# Patient Record
Sex: MALE | Race: WHITE | Employment: FULL TIME | URBAN - METROPOLITAN AREA
[De-identification: names, ages, dates, MRNs, and addresses within clinical notes are randomized per-mention and may not be internally consistent; named-entity substitution may affect disease eponyms.]

---

## 2017-03-16 ENCOUNTER — TRANSFERRED RECORDS (OUTPATIENT)
Dept: HEALTH INFORMATION MANAGEMENT | Facility: CLINIC | Age: 47
End: 2017-03-16

## 2017-07-17 ENCOUNTER — OFFICE VISIT (OUTPATIENT)
Dept: FAMILY MEDICINE | Facility: CLINIC | Age: 47
End: 2017-07-17
Payer: COMMERCIAL

## 2017-07-17 VITALS
WEIGHT: 180 LBS | TEMPERATURE: 98 F | DIASTOLIC BLOOD PRESSURE: 78 MMHG | HEIGHT: 69 IN | OXYGEN SATURATION: 98 % | HEART RATE: 61 BPM | BODY MASS INDEX: 26.66 KG/M2 | SYSTOLIC BLOOD PRESSURE: 104 MMHG

## 2017-07-17 DIAGNOSIS — R41.840 CONCENTRATION DEFICIT: ICD-10-CM

## 2017-07-17 DIAGNOSIS — F41.1 ANXIETY STATE: Primary | ICD-10-CM

## 2017-07-17 PROCEDURE — 99214 OFFICE O/P EST MOD 30 MIN: CPT | Performed by: PHYSICIAN ASSISTANT

## 2017-07-17 ASSESSMENT — ANXIETY QUESTIONNAIRES
5. BEING SO RESTLESS THAT IT IS HARD TO SIT STILL: NEARLY EVERY DAY
6. BECOMING EASILY ANNOYED OR IRRITABLE: NOT AT ALL
GAD7 TOTAL SCORE: 5
3. WORRYING TOO MUCH ABOUT DIFFERENT THINGS: NOT AT ALL
1. FEELING NERVOUS, ANXIOUS, OR ON EDGE: NOT AT ALL
2. NOT BEING ABLE TO STOP OR CONTROL WORRYING: NOT AT ALL
7. FEELING AFRAID AS IF SOMETHING AWFUL MIGHT HAPPEN: NOT AT ALL
IF YOU CHECKED OFF ANY PROBLEMS ON THIS QUESTIONNAIRE, HOW DIFFICULT HAVE THESE PROBLEMS MADE IT FOR YOU TO DO YOUR WORK, TAKE CARE OF THINGS AT HOME, OR GET ALONG WITH OTHER PEOPLE: VERY DIFFICULT

## 2017-07-17 ASSESSMENT — PATIENT HEALTH QUESTIONNAIRE - PHQ9: 5. POOR APPETITE OR OVEREATING: MORE THAN HALF THE DAYS

## 2017-07-17 NOTE — PATIENT INSTRUCTIONS
Please start taking the Zoloft 50 mg tabs, one tab daily instead of 1/2 tab daily.      Please make appointment for assessment for the issues with concentration.     Please followup in clinic in the next 3-4 weeks to see how you are doing on the increased Zoloft dose.      Please be seen sooner if needed.     Please seek immediate medical attention if you develop thoughts of wanting to hurt yourself or others.

## 2017-07-17 NOTE — NURSING NOTE
"Chief Complaint   Patient presents with     Recheck Medication       Initial /78 (BP Location: Left arm, Patient Position: Chair, Cuff Size: Adult Large)  Pulse 61  Temp 98  F (36.7  C) (Oral)  Ht 5' 9\" (1.753 m)  Wt 180 lb (81.6 kg)  SpO2 98%  BMI 26.58 kg/m2 Estimated body mass index is 26.58 kg/(m^2) as calculated from the following:    Height as of this encounter: 5' 9\" (1.753 m).    Weight as of this encounter: 180 lb (81.6 kg).  Medication Reconciliation: complete   Csaba Mlnarik CMA    "

## 2017-07-17 NOTE — PROGRESS NOTES
SUBJECTIVE:                                                    Richard Loza is a 47 year old male who presents to clinic today for the following health issues:      Anxiety Follow-Up    Status since last visit: No change - feels well controlled    Other associated symptoms:cannot focus like used to be able to. Work has changed a little, fidgety and mind wondering -- more multi-tasking than prior -- is concerned    Complicating factors:   Significant life event: No   Current substance abuse: None  Depression symptoms: No  JANAK-7 SCORE 3/30/2013 6/4/2014   Total Score 0 2     GAD7            Amount of exercise or physical activity: 3-4 days/week for an average of 60 minutes    Problems taking medications regularly: No    Medication side effects: none    Diet: gluten-free/reduced and Reduced dairy      Patient is here for a followup.  He reports that he is doing well on the Zoloft and thinks that it is treating what it is supposed to be treating.  He does however have concern about being more fidgety and having his mind wonder.    Patient reports that he has a hard time focusing.  He has had a job change and he is having a harder time staying on task.  He is getting things done, but he finds himself wandering or fidgeting.  He says he has been like this all the way through high school, but now that his job duties have increased, he sees it again and is concerned about his ability to do his job.  He says that his mind tends to dwell on the tasks and his job also.      He reports that he was once on a higher Zoloft dose, and is unsure why he was moved to a lower dose.        Problem list and histories reviewed & adjusted, as indicated.  Additional history: as documented      ROS:  Constitutional, HEENT, cardiovascular, pulmonary, GI, , musculoskeletal, neuro, skin, endocrine and psych systems are negative, except as otherwise noted.    OBJECTIVE:                                                    /78 (BP  "Location: Left arm, Patient Position: Chair, Cuff Size: Adult Large)  Pulse 61  Temp 98  F (36.7  C) (Oral)  Ht 5' 9\" (1.753 m)  Wt 180 lb (81.6 kg)  SpO2 98%  BMI 26.58 kg/m2  Body mass index is 26.58 kg/(m^2).  GENERAL: healthy, alert and no distress  EYES: Eyes grossly normal to inspection, PERRL and conjunctivae and sclerae normal  RESP: lungs clear to auscultation - no rales, rhonchi or wheezes  CV: regular rate and rhythm, normal S1 S2, no S3 or S4, no murmur, click or rub, no peripheral edema and peripheral pulses strong  MS: no gross musculoskeletal defects noted, no edema  NEURO: Normal strength and tone, mentation intact and speech normal  PSYCH: mentation appears normal, affect normal/bright    Diagnostic Test Results:  none      ASSESSMENT/PLAN:                                                      Richard was seen today for recheck medication.    Diagnoses and all orders for this visit:    Anxiety state  -     sertraline (ZOLOFT) 50 MG tablet; Take 1 tablet (50 mg) by mouth daily  -     MENTAL HEALTH REFERRAL    Concentration deficit  -     MENTAL HEALTH REFERRAL    - Zoloft increased as patient describes symptoms of continued anxiety.   - Referral for adult ADHD testing done today as well.     - Followup as advised on plan, or sooner if needed.   - Patient denies thoughts of wanting to hurt himself or others, and was instructed to seek immediate medical attention if thoughts occur.     - Patient agreed with and understood the plan today.     See Patient Instructions:  Please start taking the Zoloft 50 mg tabs, one tab daily instead of 1/2 tab daily.      Please make appointment for assessment for the issues with concentration.     Please followup in clinic in the next 3-4 weeks to see how you are doing on the increased Zoloft dose.      Please be seen sooner if needed.     Please seek immediate medical attention if you develop thoughts of wanting to hurt yourself or others.          Muna Mcclain, " JUDIE    [facility]

## 2017-07-18 ASSESSMENT — ANXIETY QUESTIONNAIRES: GAD7 TOTAL SCORE: 5

## 2017-07-18 ASSESSMENT — PATIENT HEALTH QUESTIONNAIRE - PHQ9: SUM OF ALL RESPONSES TO PHQ QUESTIONS 1-9: 6

## 2017-08-24 ENCOUNTER — FCC EXTENDED DOCUMENTATION (OUTPATIENT)
Dept: PSYCHOLOGY | Facility: CLINIC | Age: 47
End: 2017-08-24

## 2017-08-24 ENCOUNTER — OFFICE VISIT (OUTPATIENT)
Dept: PSYCHOLOGY | Facility: CLINIC | Age: 47
End: 2017-08-24
Payer: COMMERCIAL

## 2017-08-24 DIAGNOSIS — F41.9 ANXIETY DISORDER, UNSPECIFIED: Primary | ICD-10-CM

## 2017-08-24 PROCEDURE — 90834 PSYTX W PT 45 MINUTES: CPT | Performed by: PSYCHOLOGIST

## 2017-08-24 ASSESSMENT — ANXIETY QUESTIONNAIRES
IF YOU CHECKED OFF ANY PROBLEMS ON THIS QUESTIONNAIRE, HOW DIFFICULT HAVE THESE PROBLEMS MADE IT FOR YOU TO DO YOUR WORK, TAKE CARE OF THINGS AT HOME, OR GET ALONG WITH OTHER PEOPLE: SOMEWHAT DIFFICULT
GAD7 TOTAL SCORE: 6
7. FEELING AFRAID AS IF SOMETHING AWFUL MIGHT HAPPEN: NOT AT ALL
3. WORRYING TOO MUCH ABOUT DIFFERENT THINGS: MORE THAN HALF THE DAYS
2. NOT BEING ABLE TO STOP OR CONTROL WORRYING: SEVERAL DAYS
5. BEING SO RESTLESS THAT IT IS HARD TO SIT STILL: SEVERAL DAYS
6. BECOMING EASILY ANNOYED OR IRRITABLE: NOT AT ALL
1. FEELING NERVOUS, ANXIOUS, OR ON EDGE: SEVERAL DAYS

## 2017-08-24 ASSESSMENT — PATIENT HEALTH QUESTIONNAIRE - PHQ9
SUM OF ALL RESPONSES TO PHQ QUESTIONS 1-9: 5
5. POOR APPETITE OR OVEREATING: SEVERAL DAYS

## 2017-08-24 NOTE — PROGRESS NOTES
Progress Note - Initial Session    Client Name:  Richard Loza Date: 8/24/2017         Service Type: Individual/ADHD Eval Intake      Session Start Time: 2:10  Session End Time: 2:50       Session Length: 40 minutes      Session #: 1     Attendees: Client attended alone      Diagnostic Assessment in progress.  Unable to complete documentation at the conclusion of the first session due to gathering extensive information regarding symptom presentation, history, and impact on functioning. Client reported significant history of anxiety and inattention. No risk issues reported.      Mental Status Assessment:  Appearance:   Appropriate   Eye Contact:   Good   Psychomotor Behavior: Normal   Attitude:   Cooperative   Orientation:   All  Speech   Rate / Production: Normal    Volume:  Normal   Mood:    Somewhat anxious  Affect:    Appropriate   Thought Content:  Clear   Thought Form:  Coherent  Logical   Insight:    Good       Safety Issues and Plan for Safety and Risk Management:  Client denies current fears or concerns for personal safety.  Client denies current or recent suicidal ideation or behaviors.  Client denies current or recent homicidal ideation or behaviors.  Client denies current or recent self injurious behavior or ideation.  Client denies other safety concerns.  A safety and risk management plan has not been developed at this time, however client was given the after-hours number / 911 should there be a change in any of these risk factors.  Client did not report whether he has access to firearms; will continue assessing at the time of our next visit.      Diagnostic Criteria:  Anxiety disorder is present, but at this time therapist is unable to determine whether it is primary.  Further assessment needed.        DSM5 Diagnoses: (Sustained by DSM5 Criteria Listed Above)  Diagnoses: 300.00 (F41.9) Unspecified Anxiety Disorder  Psychosocial & Contextual Factors: Client reported longstanding  history of anxiety which he believes is being well-managed with medication (Zoloft). He has Chron's Disease which has been exacerbated by anxiety in the past. 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 He is struggling with task completion and focus and this is worrying him. He described struggling with inattention since elementary school.  WHODAS 2.0 (12 item)            This questionnaire asks about difficulties due to health conditions. Health conditions  include  disease or illnesses, other health problems that may be short or long lasting,  injuries, mental health or emotional problems, and problems with alcohol or drugs.                     Think back over the past 30 days and answer these questions, thinking about how much  difficulty you had doing the following activities. For each question, please [place] only  one response.    S1 Standing for long periods such as 30 minutes? Severe =       4   S2 Taking care of household responsibilities? Moderate =   3   S3 Learning a new task, for example, learning how to get to a new place? Moderate =   3   S4 How much of a problem do you have joining community activities (for example, festivals, [employer] or other activities) in the same way as anyone else can? Moderate =   3   S5 How much have you been emotionally affected by your health problems? Moderate =   3     In the past 30 days, how much difficulty did you have in:   S6 Concentrating on doing something for ten minutes? Severe =       4   S7 Walking a long distance such as a kilometer (or equivalent)? Moderate =   3   S8 Washing your whole body? None =         1   S9 Getting dressed? None =         1   S10 Dealing with people you do not know? None =         1   S11 Maintaining a friendship? None =         1   S12 Your day to day work? Moderate =   3     H1 Overall, in the past 30 days, how many days were  these difficulties present? Record number of days 30   H2 In the past 30 days, for how many days were you totally unable to carry out your usual activities or work because of any health condition? Record number of days  0   H3 In the past 30 days, not counting the days that you were totally unable, for how many days did you cut back or reduce your usual activities or work because of any health condition? Record number of days 0       Collateral Reports Completed:  Routed note to PCP      PLAN: (Homework, other):  Client RTC next week to complete ADHD DA. He was given self-report and collateral-report measures to complete for our next session.      Vee Luque, PhD, LP

## 2017-08-24 NOTE — Clinical Note
Marcial Toro,  I met with Richard today to begin the ADHD evaluation. We will continue with the assessment and I will be sure to route the DA and final report to you. Please let me know if you have any questions or concerns. Thank you for the referral!  Vee Luque

## 2017-08-24 NOTE — MR AVS SNAPSHOT
MRN:[id number]                      After Visit Summary   8/24/2017    Richard Loza    MRN: [id number]           Visit Information        Provider Department      8/24/2017 2:00 PM Vee Luque, PhD [facility] ADHD      Your next 10 appointments already scheduled     Aug 31, 2017 10:00 AM CDT   Return Visit with Vee Luque, PhD   [facility] ([facility])    [address] [place] [id number] [phone]              MyChart Information     [facility] gives you secure access to your electronic health record. If you see a primary care provider, you can also send messages to your care team and make appointments. If you have questions, please call your primary care clinic.  If you do not have a primary care provider, please call [phone] and they will assist you.        Care EveryWhere ID     This is your Care EveryWhere ID. This could be used by other organizations to access your [place] medical records  [id number]        Equal Access to Services     AMOL GARZON : Hadii aad ku hadasho [employer], waaxda luqadaha, qaybta kaalmada adearies, viky mehta . So [facility] [phone].    ATENCIÓN: Si habla español, tiene a qureshi disposición servicios gratuitos de asistencia lingüística. Llame al [phone].    We comply with applicable federal civil rights laws and [place] laws. We do not discriminate on the basis of race, color, national origin, age, disability sex, sexual orientation or gender identity.

## 2017-08-25 ASSESSMENT — ANXIETY QUESTIONNAIRES: GAD7 TOTAL SCORE: 6

## 2017-08-31 ENCOUNTER — DOCUMENTATION ONLY (OUTPATIENT)
Dept: PSYCHOLOGY | Facility: CLINIC | Age: 47
End: 2017-08-31
Payer: COMMERCIAL

## 2017-08-31 ENCOUNTER — OFFICE VISIT (OUTPATIENT)
Dept: PSYCHOLOGY | Facility: CLINIC | Age: 47
End: 2017-08-31
Payer: COMMERCIAL

## 2017-08-31 DIAGNOSIS — F90.2 ATTENTION DEFICIT HYPERACTIVITY DISORDER (ADHD), COMBINED TYPE: ICD-10-CM

## 2017-08-31 DIAGNOSIS — F41.1 GAD (GENERALIZED ANXIETY DISORDER): Primary | ICD-10-CM

## 2017-08-31 DIAGNOSIS — F90.2 ATTENTION DEFICIT HYPERACTIVITY DISORDER (ADHD), COMBINED TYPE: Primary | ICD-10-CM

## 2017-08-31 DIAGNOSIS — F41.9 ANXIETY DISORDER, UNSPECIFIED: ICD-10-CM

## 2017-08-31 PROCEDURE — 96101 HC PSYCHOLOGICAL TEST BY PSYCHOLOGIST/MD, PER HR: CPT | Performed by: PSYCHOLOGIST

## 2017-08-31 PROCEDURE — 90791 PSYCH DIAGNOSTIC EVALUATION: CPT | Performed by: PSYCHOLOGIST

## 2017-08-31 NOTE — PROGRESS NOTES
"Client: Richard Loza  MRN: [id number]  : 1970    [place] Adult ADHD Rating Scale-IV: Self and Other Reports (BAARS-IV)  The BAARS-IV assesses for symptoms of ADHD that are experienced in one's daily life. This assessment measure includes self and collateral rating scales designed to provide information regarding current and childhood symptoms of ADHD including inattention, hyperactivity, and impulsivity. Self-report scores are reported as percentiles. Scores at the 76th-83rd percentile are considered marginal, scores at the 84th-92nd percentile are considered borderline, scores at the 93rd-95th percentile are considered mild, scores at the 96th-98th percentile are considered moderate, and those at the 99th percentile are considered severe. Collateral or \"other\" rating scales are reported as number of symptoms observed in comparison to those reported by the client. Norms and percentile scores are not available for collateral reports.      Current Symptoms Scale--Self Report:   Client completed the self-report inventory of current symptoms. The results indicate that the client's Total ADHD Score was 57 which places him in the 99th percentile for overall ADHD symptoms. In addition, the client endorsed the following occur \"often\" or \"very often\": 9/9 (99th percentile) Inattention symptoms, 4/9 (97th percentile) Hyperactivity/Impulsivity symptoms, and 3/9 (90th percentile) Sluggish Cognitive Tempo symptoms. Client indicated that the reported symptoms have resulted in impaired functioning in school, home, work and social relationships. Overall, the results suggest the client is reporting severe symptoms of inattention and moderate symptoms of hyperactivity/impulsivity at this time.      Current Symptoms Scale--Other Report:  Client's wife completed the collateral report inventory of current symptoms. Based on the collateral contact's observation of symptoms, the client demonstrates the following \"often\" or \"very " "often\": 9/9 Inattention symptoms, 2/5 Hyperactivity symptoms, 1/4 Impulsivity symptoms, and 2/9 Sluggish Cognitive Tempo symptoms. The client's Total ADHD Score was 44. The collateral contact indicated these symptoms causes impairment in school, home, work, and social relationships. The collateral- and self-report scores are similar and suggest the Client is experiencing symptoms of inattention at this time.     Childhood Symptoms Scale--Self-Report:  Client completed the self-report inventory of childhood symptoms. The results indicate that the client's Total ADHD Score was 66 which places him in the 99th percentile for overall ADHD symptoms in childhood. In addition, the client endorsed having experienced the following \"often\" or \"very often\": 9/9 (99th percentile) Inattention symptoms and 8/9 (99th percentile) Hyperactivity-Impulsivity symptoms. Client indicated that the reported symptoms resulted in impaired functioning in school, home, and social relationships. Overall, the results suggest the client reported experiencing severe symptoms of inattention and hyperactivity/impulsivity as a child.     Childhood Symptoms Scale--Other Report:  Client s mother completed the collateral report inventory of childhood symptoms. Based on the collateral contact's recollection of client's childhood symptoms, the client demonstrated the following \"often\" or \"very often\": 6/9 Inattention symptoms and 9/9 Hyperactivity-Impulsivity symptoms. The client's Total ADHD Score was 51. The collateral contact indicated that the client demonstrated impaired functioning in school, home, and social relationships. The collateral- and self-report scores are similar and suggest Client experienced symptoms of inattention and hyperactivity/impulsivity in childhood.    [place] Functional Impairment Scale: Self and Other Reports (BFIS)  The BFIS is used to assess an individuals' psychosocial impairment in major life/daily activities that may be " "due to a mental health disorder. This assessment measure includes self and collateral rating scales. Self-report scores are reported as percentiles. Scores at the 76th-83rd percentile are considered marginal, scores at the 84th-92nd percentile are considered borderline, scores at the 93rd-95th percentile are considered mild, scores at the 96th-98th percentile are considered moderate, and those at the 99th percentile are considered severe. Collateral or \"other\" rating scales are reported as number of symptoms observed in comparison to those reported by the client. Norms and percentile scores are not available for collateral reports.      Results indicate the client identified impairment (scores at or greater than 93rd percentile) in the following areas: home-chores, work, social-strangers, social-friends, education, money management, driving, sexual relations, daily responsibilities and health maintenance. The client's Mean Impairment Score was 6.33 (97th percentile) indicating the client is reporting moderate impairment in functioning across domains. Client's wife completed the collateral rating scale, which indicated somewhat discrepant results (e.g., Mean Impairment Score of 5.26). The collateral contact s scores were generally lower. Client's wife identified impairment in the areas of home-family, home-chores, work, education, money management, driving, and daily responsibilities.      [place] Deficits in Executive Functioning Scale (BDEFS)  The BDEFS is a measure used for evaluating dimensions of adult executive functioning in daily life. This assessment measure includes self and collateral rating scales. Self-report scores are reported as percentiles. Scores at the 76th-83rd percentile are considered marginal, scores at the 84th-92nd percentile are considered borderline, scores at the 93rd-95th percentile are considered mild, scores at the 96th-98th percentile are considered moderate, and those at the 99th " "percentile are considered severe. Collateral or \"other\" rating scales are reported as number of symptoms observed in comparison to those reported by the client. Norms and percentile scores are not available for collateral reports.      Results indicate the client's Total Executive Functioning Score was 253 (98th percentile). The ADHD-Executive Functioning Index score was 33 (99th percentile). These scores suggest the client has borderline to moderate to severe deficits in executive functioning. These deficits may be due to ADHD or another mental health disorder. Results indicate the client identified significant deficits in the following areas: self-management to time (severe), self-organization/problem-solving (moderate), self-restraint (mild), and self-motivation (moderate). Client's wife completed the collateral rating scale, which indicated somewhat discrepant results. The collateral contact's scores were generally lower than the client's report. She noted moderate impairment in self-motivation and mild impairment in self-restraint.    Generalized Anxiety Disorder Questionnaire (JANAK-7)  This questionnaire is designed to screen for anxiety in adults. Based on the client's score of 3, he is reporting low symptoms of anxiety. Client identified the following symptoms of anxiety: feeling on edge/nervous/anxious, trouble relaxing, and restlessness.    Patient Health Questionnaire- 9 (PHQ-9)   This questionnaire is designed to screen for depression in adults. Based on the client's score of 10, he is reporting moderate symptoms of depression. Client endorsed the following occurring  Several Days : feeling down, poor appetite or overeating, feeling bad about self, and feeling fidgety and restless. He endorsed the following symptoms occurring  Nearly Every Day : trouble falling asleep and trouble concentrating.  "

## 2017-08-31 NOTE — MR AVS SNAPSHOT
MRN:[id number]                      After Visit Summary   8/31/2017    Richard Loza    MRN: [id number]           Visit Information        Provider Department      8/31/2017 10:00 AM Vee Luque, PhD [facility] ADHD      MyChart Information     MyChart gives you secure access to your electronic health record. If you see a primary care provider, you can also send messages to your care team and make appointments. If you have questions, please call your primary care clinic.  If you do not have a primary care provider, please call [phone] and they will assist you.        Care EveryWhere ID     This is your Care EveryWhere ID. This could be used by other organizations to access your [place] medical records  [id number]        Equal Access to Services     AMOL GARZON : Rodolfo Moreno, waaxda luqadaha, qaybta kaalmaelliot ricci, viky clifton. So [facility] [phone].    ATENCIÓN: Si habla español, tiene a qureshi disposición servicios gratuitos de asistencia lingüística. Llame al [phone].    We comply with applicable federal civil rights laws and [place] laws. We do not discriminate on the basis of race, color, national origin, age, disability sex, sexual orientation or gender identity.

## 2017-08-31 NOTE — Clinical Note
Marcial Toro,  Here is the DA for Richard's ADHD Evaluation. We will continue with the assessment. I will score and interpret self-report and collateral report measures and Richard will take the MMPI-2. He meets DSM5 criteria for Generalized Anxiety Disorder and has been assigned a provisional diagnosis of ADHD, Combined Presentation. I will be sure to route a copy of the final report to you. Please let me know if you have questions or concerns.  Thank you! Vee Luque
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
## 2017-09-18 ENCOUNTER — OFFICE VISIT (OUTPATIENT)
Dept: PSYCHOLOGY | Facility: CLINIC | Age: 47
End: 2017-09-18
Payer: COMMERCIAL

## 2017-09-18 DIAGNOSIS — F90.2 ATTENTION DEFICIT HYPERACTIVITY DISORDER (ADHD), COMBINED TYPE: Primary | ICD-10-CM

## 2017-09-18 PROCEDURE — 99207 ZZC NO CHARGE LOS: CPT | Performed by: PSYCHOLOGIST

## 2017-09-18 NOTE — MR AVS SNAPSHOT
MRN:[id number]                      After Visit Summary   9/18/2017    Richard Loza    MRN: [id number]           Visit Information        Provider Department      9/18/2017 2:00 PM Vee Luque, PhD [facility] ADHD      MyChart Information     MyChart gives you secure access to your electronic health record. If you see a primary care provider, you can also send messages to your care team and make appointments. If you have questions, please call your primary care clinic.  If you do not have a primary care provider, please call [phone] and they will assist you.        Care EveryWhere ID     This is your Care EveryWhere ID. This could be used by other organizations to access your [facility] medical records  [id number]        Equal Access to Services     AMOL GARZON : Rodolfo Moreno, waaxda luqadaha, qaybta kaalmaelliot ricci, viky clifton. So [facility] [phone].    ATENCIÓN: Si habla español, tiene a qureshi disposición servicios gratuitos de asistencia lingüística. Llame al [phone].    We comply with applicable federal civil rights laws and [place] laws. We do not discriminate on the basis of race, color, national origin, age, disability sex, sexual orientation or gender identity.

## 2017-09-18 NOTE — PROGRESS NOTES
Client arrived to take the MMPI.  There were no reported difficulties with taking the test.  Client will receive results of the testing once the evaluation is completed.

## 2017-09-20 ENCOUNTER — DOCUMENTATION ONLY (OUTPATIENT)
Dept: PSYCHOLOGY | Facility: CLINIC | Age: 47
End: 2017-09-20
Payer: COMMERCIAL

## 2017-09-20 DIAGNOSIS — F90.2 ATTENTION DEFICIT HYPERACTIVITY DISORDER (ADHD), COMBINED TYPE: Primary | ICD-10-CM

## 2017-09-20 PROCEDURE — 96101 HC PSYCHOLOGICAL TEST BY PSYCHOLOGIST/MD, PER HR: CPT | Performed by: PSYCHOLOGIST

## 2017-09-20 NOTE — PROGRESS NOTES
Client: Richard Loza  MRN: [id number]  : 1970    Client completed the [place] Multiphasic Personality Inventory-2 (MMPI-2), a self-report personality inventory, as part of his evaluation. Validity scales indicate that the client responded in a manner with an indiscriminate preference for False-marking or naysaying. Underreporting is possible and suggests that the Client may deny problems or weaknesses. As such, retesting may be warranted. The results should be interpreted with caution and in light of other information. Client s profile reflects low levels of general emotional distress and did not produce significant elevations on any clinical scales or subscales.

## 2017-09-21 ENCOUNTER — DOCUMENTATION ONLY (OUTPATIENT)
Dept: PSYCHOLOGY | Facility: CLINIC | Age: 47
End: 2017-09-21
Payer: COMMERCIAL

## 2017-09-21 ENCOUNTER — OFFICE VISIT (OUTPATIENT)
Dept: PSYCHOLOGY | Facility: CLINIC | Age: 47
End: 2017-09-21
Payer: COMMERCIAL

## 2017-09-21 DIAGNOSIS — F41.1 GAD (GENERALIZED ANXIETY DISORDER): ICD-10-CM

## 2017-09-21 DIAGNOSIS — F90.2 ATTENTION DEFICIT HYPERACTIVITY DISORDER (ADHD), COMBINED TYPE: Primary | ICD-10-CM

## 2017-09-21 PROCEDURE — 90832 PSYTX W PT 30 MINUTES: CPT | Performed by: PSYCHOLOGIST

## 2017-09-21 PROCEDURE — 96101 HC PSYCHOLOGICAL TEST BY PSYCHOLOGIST/MD, PER HR: CPT | Performed by: PSYCHOLOGIST

## 2017-09-21 NOTE — MR AVS SNAPSHOT
MRN:[id number]                      After Visit Summary   9/21/2017    Richard Loza    MRN: [id number]           Visit Information        Provider Department      9/21/2017 3:00 PM Vee Luque, PhD [facility]      Your next 10 appointments already scheduled     Nov 10, 2017  9:00 AM CST   Return Visit with Vee Luuqe, PhD   [facility] ([facility])    [address] [place] [id number] [phone]              MyChart Information     [employer] gives you secure access to your electronic health record. If you see a primary care provider, you can also send messages to your care team and make appointments. If you have questions, please call your primary care clinic.  If you do not have a primary care provider, please call [phone] and they will assist you.        Care EveryWhere ID     This is your Care EveryWhere ID. This could be used by other organizations to access your [facility] medical records  [id number]        Equal Access to Services     AMOL GARZON : Hadii tor cristobalo Soclarice, waaxda luqadaha, qaybta kaalmaelliot ricci, viky clifton. So [facility] [phone].    ATENCIÓN: Si habla español, tiene a qureshi disposición servicios gratuitos de asistencia lingüística. Llame al [phone].    We comply with applicable federal civil rights laws and [place] laws. We do not discriminate on the basis of race, color, national origin, age, disability sex, sexual orientation or gender identity.
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
## 2017-09-21 NOTE — PROGRESS NOTES
Client Name: Richard Loza              Date: 9/21/2017    Service Type: Individual (ADHD Evaluation feedback session)     Session Start Time: 3:00 Session End Time: 3:27     Session Length: 27 minutes      Session #: (feedback)     Attendees: Client attended alone        DATA        Treatment Objective(s) Addressed in This Session:   Provided feedback on ADHD evaluation. Reviewed test results in depth and answered client's questions. Client diagnosed with ADHD, Combined Presentation and Generalized Anxiety Disorder. Reviewed ADHD symptom management handout. This provider also completed full written report of evaluation, including integration of testing data, summary, and recommendations. Please see Documentation Only dated 9/21/17.     Progress on / Status of Treatment Objective(s) / Homework:   Completed      Intervention:  ADHD Evaluation feedback; Reviewed report (can be found in Documentation Only encounter dated 9/21/17); Client was appreciative of the feedback and expressed understanding of the diagnoses. He expressed intent to follow up with his PCP to discuss medication management.        ASSESSMENT: Current Emotional / Mental Status (status of significant symptoms):  Risk status (Self / Other harm or suicidal ideation)  Client denies current fears or concerns for personal safety.  Client denies current or recent suicidal ideation or behaviors.  Client denies current or recent homicidal ideation or behaviors.  Client denies current or recent self-injurious behavior or ideation.  Client denies other safety concerns.  A safety and risk management plan has not been developed at this time, however client was given the after-hours number / 911 should there be a change in any of these risk factors.     Appearance: Appropriate   Eye Contact: Good   Psychomotor Behavior: Normal   Attitude: Cooperative   Orientation: All  Speech  Rate / Production: Normal   Volume: Normal   Mood: Normal  Affect: Appropriate    Thought Content: Clear   Thought Form: Coherent Logical   Insight: Good      Medication Review:  Client is prescribed Zoloft      Medication Compliance:  Yes     Changes in Health Issues:  None reported     Chemical Use Review:  Substance Use: Chemical use reviewed, no active concerns identified      Tobacco Use: No current tobacco use.      Collateral Reports Completed:  Routed note to Care Team Member(s)     PLAN: (Homework, other)     Recommendations:      1. Schedule an appointment with your physician or psychiatrist to discuss a medication evaluation. Medication can be very helpful in treating the symptoms of ADHD. It will be important that each condition is treated, as treatment for one without the other may lead to an increase in symptoms (e.g., treating ADHD, but not anxiety, can lead to increased anxiety).    2. Access resources through websites, books, and articles such as those provided in the Coping with ADHD handout.    3. Consider working with an ADHD  or individual therapist to learn skills to assist with symptom management, as well as ways to improve relationships, etc., that may have been impacted by your symptoms.    4. If anxiety worsens or becomes more difficult to manage, individual therapy is recommended. Therapies focused on identifying and challenging problematic thought and behavior patterns while increasing the use of healthy coping skills has been found to be effective in treating anxiety. It will be important to set goals in this therapy and work actively toward achieving short-term successes that lead to the completion of each goal. Action-oriented therapies, such as CBT and ACT are particularly recommended for the treatment of chronic anxiety.    5. The use of behavioral strategies such as diaphragmatic breathing, guided imagery, and mindfulness is often helpful in the management of anxiety symptoms.    6. Schedule a follow-up appointment with me in about 6 weeks to review  symptoms, treatment involvement, and struggles and/or successes.         Vee Luque, PhD, LP

## 2017-09-25 ENCOUNTER — OFFICE VISIT (OUTPATIENT)
Dept: FAMILY MEDICINE | Facility: CLINIC | Age: 47
End: 2017-09-25
Payer: COMMERCIAL

## 2017-09-25 VITALS
WEIGHT: 184 LBS | TEMPERATURE: 98.4 F | BODY MASS INDEX: 27.25 KG/M2 | SYSTOLIC BLOOD PRESSURE: 106 MMHG | HEART RATE: 65 BPM | OXYGEN SATURATION: 99 % | DIASTOLIC BLOOD PRESSURE: 78 MMHG | HEIGHT: 69 IN

## 2017-09-25 DIAGNOSIS — F41.1 ANXIETY STATE: ICD-10-CM

## 2017-09-25 DIAGNOSIS — F90.2 ATTENTION DEFICIT HYPERACTIVITY DISORDER (ADHD), COMBINED TYPE: Primary | ICD-10-CM

## 2017-09-25 DIAGNOSIS — Z23 NEED FOR PROPHYLACTIC VACCINATION AND INOCULATION AGAINST INFLUENZA: ICD-10-CM

## 2017-09-25 PROCEDURE — 99213 OFFICE O/P EST LOW 20 MIN: CPT | Mod: 25 | Performed by: PHYSICIAN ASSISTANT

## 2017-09-25 PROCEDURE — 90686 IIV4 VACC NO PRSV 0.5 ML IM: CPT | Performed by: PHYSICIAN ASSISTANT

## 2017-09-25 PROCEDURE — 90471 IMMUNIZATION ADMIN: CPT | Performed by: PHYSICIAN ASSISTANT

## 2017-09-25 RX ORDER — DEXTROAMPHETAMINE SACCHARATE, AMPHETAMINE ASPARTATE, DEXTROAMPHETAMINE SULFATE AND AMPHETAMINE SULFATE 1.25; 1.25; 1.25; 1.25 MG/1; MG/1; MG/1; MG/1
5 TABLET ORAL 2 TIMES DAILY
Qty: 60 TABLET | Refills: 0 | Status: SHIPPED | OUTPATIENT
Start: 2017-09-25 | End: 2017-11-08

## 2017-09-25 ASSESSMENT — ANXIETY QUESTIONNAIRES
IF YOU CHECKED OFF ANY PROBLEMS ON THIS QUESTIONNAIRE, HOW DIFFICULT HAVE THESE PROBLEMS MADE IT FOR YOU TO DO YOUR WORK, TAKE CARE OF THINGS AT HOME, OR GET ALONG WITH OTHER PEOPLE: SOMEWHAT DIFFICULT
7. FEELING AFRAID AS IF SOMETHING AWFUL MIGHT HAPPEN: NOT AT ALL
6. BECOMING EASILY ANNOYED OR IRRITABLE: NOT AT ALL
GAD7 TOTAL SCORE: 5
5. BEING SO RESTLESS THAT IT IS HARD TO SIT STILL: SEVERAL DAYS
3. WORRYING TOO MUCH ABOUT DIFFERENT THINGS: SEVERAL DAYS
2. NOT BEING ABLE TO STOP OR CONTROL WORRYING: SEVERAL DAYS
1. FEELING NERVOUS, ANXIOUS, OR ON EDGE: SEVERAL DAYS

## 2017-09-25 ASSESSMENT — PATIENT HEALTH QUESTIONNAIRE - PHQ9
5. POOR APPETITE OR OVEREATING: SEVERAL DAYS
SUM OF ALL RESPONSES TO PHQ QUESTIONS 1-9: 4

## 2017-09-25 NOTE — MR AVS SNAPSHOT
After Visit Summary   9/25/2017    Richard Loza    MRN: [id number]           Patient Information     Date Of Birth          1970        Visit Information        Provider Department      9/25/2017 8:00 AM Muna Mcclain PA-C [facility]        Today's Diagnoses     Need for prophylactic vaccination and inoculation against influenza    -  1    Attention deficit hyperactivity disorder (ADHD), combined type        Anxiety state          Care Instructions    Please start with 5 mg tabs.  Take one tab two times daily.  Can increase dose by 5 mg increments weekly.  Can go to 10 mg in the morning (2 tab) and then one tab (5 mg) in the afternoon on week two.  Than can increase to two tabs (10 mg)  two times daily on week three.      Please followup in about 2-3 weeks to see how you are doing on the dose and with any side effects.  Please followup sooner if needed.           Follow-ups after your visit        Your next 10 appointments already scheduled     Nov 10, 2017  9:00 AM CST   Return Visit with Vee Luque, PhD   [facility]    [address] [place] [id number] [phone]              Who to contact     If you have questions or need follow up information about today's clinic visit or your schedule please contact [facility] directly at [phone].  Normal or non-critical lab and imaging results will be communicated to you by MyChart, letter or phone within 4 business days after the clinic has received the results. If you do not hear from us within 7 days, please contact the clinic through MyChart or phone. If you have a critical or abnormal lab result, we will notify you by phone as soon as possible.  Submit refill requests through [employer] or call your pharmacy and they will forward the refill request to us. Please allow 3 business days for your refill to be completed.  "         Additional Information About Your Visit        MyChart Information     [employer] gives you secure access to your electronic health record. If you see a primary care provider, you can also send messages to your care team and make appointments. If you have questions, please call your primary care clinic.  If you do not have a primary care provider, please call [phone] and they will assist you.        Care EveryWhere ID     This is your Care EveryWhere ID. This could be used by other organizations to access your [facility] medical records  [id number]        Your Vitals Were     Pulse Temperature Height Pulse Oximetry BMI (Body Mass Index)       65 98.4  F (36.9  C) (Oral) 5' 9\" (1.753 m) 99% 27.17 kg/m2        Blood Pressure from Last 3 Encounters:   09/25/17 106/78   07/17/17 104/78   10/18/16 118/76    Weight from Last 3 Encounters:   09/25/17 184 lb (83.5 kg)   07/17/17 180 lb (81.6 kg)   10/18/16 186 lb (84.4 kg)              We Performed the Following     FLU VAC, SPLIT VIRUS IM > 3 YO (QUADRIVALENT) [78994]     Vaccine Administration, Initial [93459]          Today's Medication Changes          These changes are accurate as of: 9/25/17  8:42 AM.  If you have any questions, ask your nurse or doctor.               Start taking these medicines.        Dose/Directions    amphetamine-dextroamphetamine 5 MG per tablet   Commonly known as:  ADDERALL   Used for:  Attention deficit hyperactivity disorder (ADHD), combined type   Started by:  Muna Mcclain, PAManaC        Dose:  5 mg   Take 1 tablet (5 mg) by mouth 2 times daily   Quantity:  60 tablet   Refills:  0            Where to get your medicines      These medications were sent to [facility] [id number] [facility] [address] [facility] [address] [address], [facility] [address]    Hours:  24-hours Phone:  [phone]     sertraline 50 MG tablet         Some of these will need a paper prescription and others " can be bought over the counter.  Ask your nurse if you have questions.     Bring a paper prescription for each of these medications     amphetamine-dextroamphetamine 5 MG per tablet                Primary Care Provider Office Phone # Fax #    Etienne Mistry [phone] [address] [place] [address]        Equal Access to Services     AMOL GARZON : Hadii aad ku hadasho Soomaali, waaxda luqadaha, qaybta kaalmada adeegyada, waxay idiin hayaan adeeg kharash la'georgien . So [facility] [phone].    ATENCIÓN: Si habla español, tiene a qureshi disposición servicios gratuitos de asistencia lingüística. Llame al [phone].    We comply with applicable federal civil rights laws and [place] laws. We do not discriminate on the basis of race, color, national origin, age, disability sex, sexual orientation or gender identity.            Thank you!     Thank you for choosing [facility]  for your care. Our goal is always to provide you with excellent care. Hearing back from our patients is one way we can continue to improve our services. Please take a few minutes to complete the written survey that you may receive in the mail after your visit with us. Thank you!             Your Updated Medication List - Protect others around you: Learn how to safely use, store and throw away your medicines at www.disposemymeds.org.          This list is accurate as of: 9/25/17  8:42 AM.  Always use your most recent med list.                   Brand Name Dispense Instructions for use Diagnosis    amphetamine-dextroamphetamine 5 MG per tablet    ADDERALL    60 tablet    Take 1 tablet (5 mg) by mouth 2 times daily    Attention deficit hyperactivity disorder (ADHD), combined type       colestipol 1 G tablet    COLESTID    90 tablet    Take 1 tablet (1 g) by mouth daily    Crohn's disease of both small and large intestine without complication (H)       HUMIRA PEN 40 MG/0.8ML injection   Generic drug:   adalimumab           sertraline 50 MG tablet    ZOLOFT    30 tablet    Take 1 tablet (50 mg) by mouth daily    Anxiety state

## 2017-09-25 NOTE — PROGRESS NOTES
"  SUBJECTIVE:                                                    Richard Loza is a 47 year old male who presents to clinic today for the following health issues:      Anxiety Follow-Up    Status since last visit: Improved slightly    Other associated symptoms:when feels anxious has a tight feeling in thorax    Complicating factors:   Significant life event: No   Current substance abuse: None  Depression symptoms: No  JANAK-7 SCORE 6/4/2014 7/17/2017 8/24/2017   Total Score 2 - -   Total Score - 5 6       GAD7    PHQ-9 SCORE 6/2/2014 7/17/2017 8/24/2017   Total Score 0 - -   Total Score - 6 5         Amount of exercise or physical activity: 7 days/week for an average of 30 minutes    Problems taking medications regularly: No    Medication side effects: none  Diet: regular (no restrictions)    Patient reports that he is doing well on the Zoloft.  He reports that he did get assessed for adult ADHD and was found to have ADHD and generalized anxiety.  He is interested in starting medication for the ADHD.      Problem list and histories reviewed & adjusted, as indicated.  Additional history: as documented      ROS:  Constitutional, HEENT, cardiovascular, pulmonary, GI, , musculoskeletal, neuro, skin, endocrine and psych systems are negative, except as otherwise noted.    OBJECTIVE:                                                    /78 (BP Location: Right arm, Patient Position: Chair, Cuff Size: Adult Large)  Pulse 65  Temp 98.4  F (36.9  C) (Oral)  Ht 5' 9\" (1.753 m)  Wt 184 lb (83.5 kg)  SpO2 99%  BMI 27.17 kg/m2  Body mass index is 27.17 kg/(m^2).  GENERAL: healthy, alert and no distress  EYES: Eyes grossly normal to inspection, PERRL and conjunctivae and sclerae normal  RESP: lungs clear to auscultation - no rales, rhonchi or wheezes  CV: regular rate and rhythm, normal S1 S2, no S3 or S4, no murmur, click or rub, no peripheral edema and peripheral pulses strong  MS: no gross musculoskeletal defects " noted, no edema  NEURO: Normal strength and tone, mentation intact and speech normal  PSYCH: mentation appears normal, affect normal/bright    Diagnostic Test Results:  none      ASSESSMENT/PLAN:                                                      Richard was seen today for recheck medication.    Diagnoses and all orders for this visit:    Attention deficit hyperactivity disorder (ADHD), combined type  -     amphetamine-dextroamphetamine (ADDERALL) 5 MG per tablet; Take 1 tablet (5 mg) by mouth 2 times daily    Anxiety state  -     sertraline (ZOLOFT) 50 MG tablet; Take 1 tablet (50 mg) by mouth daily    Need for prophylactic vaccination and inoculation against influenza  -     Vaccine Administration, Initial [88760]  -     FLU VAC, SPLIT VIRUS IM > 3 YO (QUADRIVALENT) [84739]        See Patient Instructions:  Please start with 5 mg tabs.  Take one tab two times daily.  Can increase dose by 5 mg increments weekly.  Can go to 10 mg in the morning (2 tab) and then one tab (5 mg) in the afternoon on week two.  Than can increase to two tabs (10 mg)  two times daily on week three.      Please followup in about 2-3 weeks to see how you are doing on the dose and with any side effects.  Please followup sooner if needed.     -- Patient agrees with and understands the plan today.          Muna Mcclain PA-C    [facility] [place]    Injectable Influenza Immunization Documentation    1.  Is the person to be vaccinated sick today?   No    2. Does the person to be vaccinated have an allergy to a component   of the vaccine?   No    3. Has the person to be vaccinated ever had a serious reaction   to influenza vaccine in the past?   No    4. Has the person to be vaccinated ever had Guillain-Barré syndrome?   No    Form completed by Pt

## 2017-09-25 NOTE — LETTER
[facility] [place]    09/25/17    Patient: Richard Loza  YOB: 1970  Medical Record Number: [id number]                                                                  Controlled Substance Agreement  I understand that my care provider has prescribed controlled substances (narcotics, tranquilizers, and/or stimulants) to help manage my condition(s).  I am taking this medicine to help me function or work.  I know that this is strong medicine.  It could have serious side effects and even cause a dependency on the drug.  If I stop these medicines suddenly, I could have severe withdrawal symptoms.    The risks, benefits, and side effects of these medication(s) were explained to me.  I agree that:  1. I will take part in other treatments as advised by my provider.  This may be psychiatry or counseling, physical therapy, behavioral therapy, group treatment, or a referral to a pain clinic.  I will reduce or stop my medicine when my provider tells me to do so.   2. I will take my medicines as prescribed.  I will not change the dose or schedule unless my provider tells me to.  There will be no refills if I  run out early.   I may be contacted at any time without warning and asked to complete a drug test or pill count.   3. I will keep all my appointments at the clinic.  If I miss appointments or fail to follow instructions, my provider may stop my medicine.  4. I will not ask other providers to prescribe controlled substances. And I will not accept controlled substances from other people. If I need another prescribed controlled substance for a new reason, I will notify my provider within one business day.  5. If I enroll in the [place] Medical Marijuana program, I will tell my provider.  I will also sign an agreement to share my medical records with my provider.  6. I will use one pharmacy to fill all of my controlled substance prescriptions.  If my prescription is mailed to my pharmacy, it may take  5 to 7 days for my medicine to be ready.  7. I understand that my provider, clinic care team, and pharmacy can track controlled substance prescriptions from other providers through a central database (prescription monitoring program).  8. I will bring in my list of medications (or my medicine bottles) each time I come to the clinic.  REV- 04/2016                                                                                                                                            Page 1 of 2      [facility] [place]    09/25/17    Patient: Richard Loza  YOB: 1970  Medical Record Number: [id number]    9. Refills of controlled substances will be made only during office hours.  It is up to me to make sure that I do not run out of my medicines on weekends or holidays.    10. I am responsible for my prescriptions.  If the medicine is lost or stolen, it will not be replaced.   I also agree not to share these medicines with anyone.  11. I agree to not use ANY illegal or recreational drugs.  This includes marijuana, cocaine, bath salts or other drugs.  I agree not to use alcohol unless my provider says I may.  I agree to give urine samples whenever asked.  If I fail to give a urine sample, the provider may stop my medicine.     12. I will tell my nurse or provider right away if I become pregnant or have a new medical problem treated outside of [facility].  13. I understand that this medicine can affect my thinking and judgment.  It may be unsafe for me to drive, use machinery and do dangerous tasks.  I will not do any of these things until I know how the medicine affects me.  If my dose changes, I will wait to see how it affects me.  I will contact my provider if I have concerns about medicine side effects.  I understand that if I do not follow any of the conditions above, my prescriptions or treatment may be stopped.    I agree that my provider, clinic care team, and pharmacy may work with  any city, state or federal law enforcement agency that investigates the misuse, sale, or other diversion of my controlled medicine. I will allow my provider to discuss my care with or share a copy of this agreement with any other treating provider, pharmacy or emergency room where I receive care.  I agree to give up (waive) any right of privacy or confidentiality with respect to these authorizations.   I have read this agreement and have asked questions about anything I did not understand.   ___________________________________    ___________________________  Patient Signature                                                           Date and Time  ___________________________________     ____________________________  Witness                                                                            Date and Time  ___________________________________  Muna Mcclain PA-C  REV-  04/2016                                                                                                                                                                 Page 2 of 2

## 2017-09-25 NOTE — PATIENT INSTRUCTIONS
Please start with 5 mg tabs.  Take one tab two times daily.  Can increase dose by 5 mg increments weekly.  Can go to 10 mg in the morning (2 tab) and then one tab (5 mg) in the afternoon on week two.  Than can increase to two tabs (10 mg)  two times daily on week three.      Please followup in about 2-3 weeks to see how you are doing on the dose and with any side effects.  Please followup sooner if needed.

## 2017-09-25 NOTE — NURSING NOTE
"Chief Complaint   Patient presents with     Recheck Medication       Initial /78 (BP Location: Right arm, Patient Position: Chair, Cuff Size: Adult Large)  Pulse 65  Temp 98.4  F (36.9  C) (Oral)  Ht 5' 9\" (1.753 m)  Wt 184 lb (83.5 kg)  SpO2 99%  BMI 27.17 kg/m2 Estimated body mass index is 27.17 kg/(m^2) as calculated from the following:    Height as of this encounter: 5' 9\" (1.753 m).    Weight as of this encounter: 184 lb (83.5 kg).  Medication Reconciliation: complete   Csaba Mlnarik CMA    "

## 2017-09-26 ASSESSMENT — ANXIETY QUESTIONNAIRES: GAD7 TOTAL SCORE: 5

## 2017-10-09 ENCOUNTER — OFFICE VISIT (OUTPATIENT)
Dept: FAMILY MEDICINE | Facility: CLINIC | Age: 47
End: 2017-10-09
Payer: COMMERCIAL

## 2017-10-09 VITALS
TEMPERATURE: 97 F | SYSTOLIC BLOOD PRESSURE: 120 MMHG | RESPIRATION RATE: 12 BRPM | BODY MASS INDEX: 26.66 KG/M2 | HEIGHT: 69 IN | DIASTOLIC BLOOD PRESSURE: 70 MMHG | HEART RATE: 72 BPM | WEIGHT: 180 LBS | OXYGEN SATURATION: 97 %

## 2017-10-09 DIAGNOSIS — F41.1 ANXIETY STATE: ICD-10-CM

## 2017-10-09 DIAGNOSIS — F90.2 ATTENTION DEFICIT HYPERACTIVITY DISORDER (ADHD), COMBINED TYPE: Primary | ICD-10-CM

## 2017-10-09 PROCEDURE — 99000 SPECIMEN HANDLING OFFICE-LAB: CPT | Performed by: PHYSICIAN ASSISTANT

## 2017-10-09 PROCEDURE — 80307 DRUG TEST PRSMV CHEM ANLYZR: CPT | Mod: 90 | Performed by: PHYSICIAN ASSISTANT

## 2017-10-09 PROCEDURE — 99213 OFFICE O/P EST LOW 20 MIN: CPT | Performed by: PHYSICIAN ASSISTANT

## 2017-10-09 RX ORDER — DEXTROAMPHETAMINE SACCHARATE, AMPHETAMINE ASPARTATE, DEXTROAMPHETAMINE SULFATE AND AMPHETAMINE SULFATE 1.25; 1.25; 1.25; 1.25 MG/1; MG/1; MG/1; MG/1
5 TABLET ORAL 3 TIMES DAILY
Qty: 90 TABLET | Refills: 0 | Status: SHIPPED | OUTPATIENT
Start: 2017-10-09 | End: 2017-11-08

## 2017-10-09 ASSESSMENT — ANXIETY QUESTIONNAIRES
3. WORRYING TOO MUCH ABOUT DIFFERENT THINGS: NOT AT ALL
2. NOT BEING ABLE TO STOP OR CONTROL WORRYING: NOT AT ALL
7. FEELING AFRAID AS IF SOMETHING AWFUL MIGHT HAPPEN: NOT AT ALL
5. BEING SO RESTLESS THAT IT IS HARD TO SIT STILL: NOT AT ALL
GAD7 TOTAL SCORE: 0
IF YOU CHECKED OFF ANY PROBLEMS ON THIS QUESTIONNAIRE, HOW DIFFICULT HAVE THESE PROBLEMS MADE IT FOR YOU TO DO YOUR WORK, TAKE CARE OF THINGS AT HOME, OR GET ALONG WITH OTHER PEOPLE: NOT DIFFICULT AT ALL
6. BECOMING EASILY ANNOYED OR IRRITABLE: NOT AT ALL
1. FEELING NERVOUS, ANXIOUS, OR ON EDGE: NOT AT ALL

## 2017-10-09 ASSESSMENT — PATIENT HEALTH QUESTIONNAIRE - PHQ9
SUM OF ALL RESPONSES TO PHQ QUESTIONS 1-9: 0
5. POOR APPETITE OR OVEREATING: NOT AT ALL

## 2017-10-09 NOTE — LETTER
[facility] [place]    10/09/17    Patient: Richard Loza  YOB: 1970  Medical Record Number: [id number]                                                                  Controlled Substance Agreement  I understand that my care provider has prescribed controlled substances (narcotics, tranquilizers, and/or stimulants) to help manage my condition(s).  I am taking this medicine to help me function or work.  I know that this is strong medicine.  It could have serious side effects and even cause a dependency on the drug.  If I stop these medicines suddenly, I could have severe withdrawal symptoms.    The risks, benefits, and side effects of these medication(s) were explained to me.  I agree that:  1. I will take part in other treatments as advised by my provider.  This may be psychiatry or counseling, physical therapy, behavioral therapy, group treatment, or a referral to a pain clinic.  I will reduce or stop my medicine when my provider tells me to do so.   2. I will take my medicines as prescribed.  I will not change the dose or schedule unless my provider tells me to.  There will be no refills if I  run out early.   I may be contacted at any time without warning and asked to complete a drug test or pill count.   3. I will keep all my appointments at the clinic.  If I miss appointments or fail to follow instructions, my provider may stop my medicine.  4. I will not ask other providers to prescribe controlled substances. And I will not accept controlled substances from other people. If I need another prescribed controlled substance for a new reason, I will notify my provider within one business day.  5. If I enroll in the [place] Medical Marijuana program, I will tell my provider.  I will also sign an agreement to share my medical records with my provider.  6. I will use one pharmacy to fill all of my controlled substance prescriptions.  If my prescription is mailed to my pharmacy, it may take  5 to 7 days for my medicine to be ready.  7. I understand that my provider, clinic care team, and pharmacy can track controlled substance prescriptions from other providers through a central database (prescription monitoring program).  8. I will bring in my list of medications (or my medicine bottles) each time I come to the clinic.  REV- 04/2016                                                                                                                                            Page 1 of 2      [facility] [place]    10/09/17    Patient: Richard Loza  YOB: 1970  Medical Record Number: [id number]    9. Refills of controlled substances will be made only during office hours.  It is up to me to make sure that I do not run out of my medicines on weekends or holidays.    10. I am responsible for my prescriptions.  If the medicine is lost or stolen, it will not be replaced.   I also agree not to share these medicines with anyone.  11. I agree to not use ANY illegal or recreational drugs.  This includes marijuana, cocaine, bath salts or other drugs.  I agree not to use alcohol unless my provider says I may.  I agree to give urine samples whenever asked.  If I fail to give a urine sample, the provider may stop my medicine.     12. I will tell my nurse or provider right away if I become pregnant or have a new medical problem treated outside of [facility].  13. I understand that this medicine can affect my thinking and judgment.  It may be unsafe for me to drive, use machinery and do dangerous tasks.  I will not do any of these things until I know how the medicine affects me.  If my dose changes, I will wait to see how it affects me.  I will contact my provider if I have concerns about medicine side effects.  I understand that if I do not follow any of the conditions above, my prescriptions or treatment may be stopped.    I agree that my provider, clinic care team, and pharmacy may work with  any city, state or federal law enforcement agency that investigates the misuse, sale, or other diversion of my controlled medicine. I will allow my provider to discuss my care with or share a copy of this agreement with any other treating provider, pharmacy or emergency room where I receive care.  I agree to give up (waive) any right of privacy or confidentiality with respect to these authorizations.   I have read this agreement and have asked questions about anything I did not understand.   ___________________________________    ___________________________  Patient Signature                                                           Date and Time  ___________________________________     ____________________________  Witness                                                                            Date and Time  ___________________________________  Muna Mcclain PA-C  REV-  04/2016                                                                                                                                                                 Page 2 of 2

## 2017-10-09 NOTE — NURSING NOTE
"Chief Complaint   Patient presents with     Recheck Medication       Initial /70  Pulse 72  Temp 97  F (36.1  C) (Tympanic)  Resp 12  Ht 5' 9\" (1.753 m)  Wt 180 lb (81.6 kg)  SpO2 97%  BMI 26.58 kg/m2 Estimated body mass index is 26.58 kg/(m^2) as calculated from the following:    Height as of this encounter: 5' 9\" (1.753 m).    Weight as of this encounter: 180 lb (81.6 kg).  Medication Reconciliation: complete   Yasmin Bloedow LPN    "

## 2017-10-09 NOTE — PROGRESS NOTES
"  SUBJECTIVE:                                                    Richard Loza is a 47 year old male who presents to clinic today for the following health issues:      Medication Followup of ***    Taking Medication as prescribed: {.:163154::\"yes\"}    Side Effects:  {NONEORCHOOSE:208560::\"None\"}    Medication Helping Symptoms:  {.:727345::\"yes\"}         ***    Problem list and histories reviewed & adjusted, as indicated.  Additional history: as documented      ROS:  Constitutional, HEENT, cardiovascular, pulmonary, GI, , musculoskeletal, neuro, skin, endocrine and psych systems are negative, except as otherwise noted.    OBJECTIVE:                                                    There were no vitals taken for this visit.  There is no height or weight on file to calculate BMI.  {Brief/partially selected :218215::\"GENERAL: healthy, alert and no distress\",\"NECK: no adenopathy, no asymmetry, masses, or scars and thyroid normal to palpation\",\"RESP: lungs clear to auscultation - no rales, rhonchi or wheezes\",\"CV: regular rate and rhythm, normal S1 S2, no S3 or S4, no murmur, click or rub, no peripheral edema and peripheral pulses strong\",\"ABDOMEN: soft, nontender, no hepatosplenomegaly, no masses and bowel sounds normal\",\"MS: no gross musculoskeletal defects noted, no edema\"}    {Diagnostic Test Results:484328::\"Diagnostic Test Results:\",\"none \"}     ASSESSMENT/PLAN:                                                      There are no diagnoses linked to this encounter.    See Patient Instructions        Muna Mcclain PA-C    [facility] [place]    "

## 2017-10-09 NOTE — PROGRESS NOTES
"  SUBJECTIVE:                                                    Richard Loza is a 47 year old male who presents to clinic today for the following health issues:      Anxiety Follow-Up    Status since last visit: Improved     Other associated symptoms:None    Complicating factors:   Significant life event: No   Current substance abuse: None  Depression symptoms: No  JANAK-7 SCORE 8/24/2017 9/25/2017 10/9/2017   Total Score - - -   Total Score 6 5 0       GAD7          Amount of exercise or physical activity: 2-3 days/week for an average of 15-30 minutes    Problems taking medications regularly: No    Medication side effects: none  Diet: regular (no restrictions)      Medication Followup of Adderall    Taking Medication as prescribed: yes    Side Effects:  None    Medication Helping Symptoms:  yes         Patient reports that he is doing well.  He feels very good with the medication that he is taking.  He says that he is \"in awe,\" of how he feels.  He does not have any concerns or side effects to discuss today.  He reports to tolerating the medication well.      He reports that he is taking one 5 mg Adderall 3 times a day.  He reports to taking them around:  7am, 9am, 1-2pm.     PHQ-9:  0   JANAK-7:  0    Problem list and histories reviewed & adjusted, as indicated.  Additional history: as documented      ROS:  Constitutional, HEENT, cardiovascular, pulmonary, GI, , musculoskeletal, neuro, skin, endocrine and psych systems are negative, except as otherwise noted.    OBJECTIVE:                                                    /70  Pulse 72  Temp 97  F (36.1  C) (Tympanic)  Resp 12  Ht 5' 9\" (1.753 m)  Wt 180 lb (81.6 kg)  SpO2 97%  BMI 26.58 kg/m2  Body mass index is 26.58 kg/(m^2).  GENERAL: healthy, alert and no distress  EYES: Eyes grossly normal to inspection, PERRL and conjunctivae and sclerae normal  RESP: lungs clear to auscultation - no rales, rhonchi or wheezes  CV: regular rate and rhythm, " normal S1 S2, no S3 or S4, no murmur, click or rub, no peripheral edema and peripheral pulses strong  MS: no gross musculoskeletal defects noted, no edema  NEURO: Normal strength and tone, mentation intact and speech normal  PSYCH: mentation appears normal, affect normal/bright    Diagnostic Test Results:  none      ASSESSMENT/PLAN:                                                      Richard was seen today for recheck medication.    Diagnoses and all orders for this visit:    Attention deficit hyperactivity disorder (ADHD), combined type  -     Drug  Screen Comprehensive, Urine w/o Reported Meds (Pain Care Package)  -     amphetamine-dextroamphetamine (ADDERALL) 5 MG per tablet; Take 1 tablet (5 mg) by mouth 3 times daily    Anxiety state      - Patient will call for refills.    - Followup in 3 months, or sooner if needed.   - Controlled substance agreement signed today and a urine drug screen left as well.     - Patient agrees with and understands the plan today.     See Patient Instructions:  Please continue the Adderall as prescribed.      Please call about one week ahead every month when you are due for refills.      Please followup in about 3 months, or sooner if needed.          Muna Mcclain PA-C    [facility]

## 2017-10-09 NOTE — PATIENT INSTRUCTIONS
Please continue the Adderall as prescribed.      Please call about one week ahead every month when you are due for refills.      Please followup in about 3 months, or sooner if needed.

## 2017-10-09 NOTE — MR AVS SNAPSHOT
After Visit Summary   10/9/2017    Richard Loza    MRN: [id number]           Patient Information     Date Of Birth          1970        Visit Information        Provider Department      10/9/2017 3:40 PM Muna Mcclain PA-C [facility]        Today's Diagnoses     Attention deficit hyperactivity disorder (ADHD), combined type    -  1    Anxiety state          Care Instructions    Please continue the Adderall as prescribed.      Please call about one week ahead every month when you are due for refills.      Please followup in about 3 months, or sooner if needed.            Follow-ups after your visit        Your next 10 appointments already scheduled     Nov 10, 2017  9:00 AM CST   Return Visit with Vee Luque, PhD   [facility]    [address] [address] [place] [address] [phone]              Who to contact     If you have questions or need follow up information about today's clinic visit or your schedule please contact [facility] directly at [phone].  Normal or non-critical lab and imaging results will be communicated to you by MyChart, letter or phone within 4 business days after the clinic has received the results. If you do not hear from us within 7 days, please contact the clinic through MyChart or phone. If you have a critical or abnormal lab result, we will notify you by phone as soon as possible.  Submit refill requests through [employer] [facility] or call your pharmacy and they will forward the refill request to us. Please allow 3 business days for your refill to be completed.          Additional Information About Your Visit        MyChart Information     [employer] [facility] gives you secure access to your electronic health record. If you see a primary care provider, you can also send messages to your care team and make appointments. If you have questions, please call your primary  "care clinic.  If you do not have a primary care provider, please call [phone] and they will assist you.        Care EveryWhere ID     This is your Care EveryWhere ID. This could be used by other organizations to access your [facility] medical records  [id number]        Your Vitals Were     Pulse Temperature Respirations Height Pulse Oximetry BMI (Body Mass Index)    72 97  F (36.1  C) (Tympanic) 12 5' 9\" (1.753 m) 97% 26.58 kg/m2       Blood Pressure from Last 3 Encounters:   10/09/17 120/70   09/25/17 106/78   07/17/17 104/78    Weight from Last 3 Encounters:   10/09/17 180 lb (81.6 kg)   09/25/17 184 lb (83.5 kg)   07/17/17 180 lb (81.6 kg)              We Performed the Following     Drug  Screen Comprehensive, Urine w/o Reported Meds (Pain Care Package)          Today's Medication Changes          These changes are accurate as of: 10/9/17  4:03 PM.  If you have any questions, ask your nurse or doctor.               These medicines have changed or have updated prescriptions.        Dose/Directions    * amphetamine-dextroamphetamine 5 MG per tablet   Commonly known as:  ADDERALL   This may have changed:  Another medication with the same name was added. Make sure you understand how and when to take each.   Used for:  Attention deficit hyperactivity disorder (ADHD), combined type   Changed by:  Muna Mcclain PA-C        Dose:  5 mg   Take 1 tablet (5 mg) by mouth 2 times daily   Quantity:  60 tablet   Refills:  0       * amphetamine-dextroamphetamine 5 MG per tablet   Commonly known as:  ADDERALL   This may have changed:  You were already taking a medication with the same name, and this prescription was added. Make sure you understand how and when to take each.   Used for:  Attention deficit hyperactivity disorder (ADHD), combined type   Changed by:  Muna Mcclain PA-C        Dose:  5 mg   Take 1 tablet (5 mg) by mouth 3 times daily   Quantity:  90 tablet   Refills:  0       * Notice:  This list has " 2 medication(s) that are the same as other medications prescribed for you. Read the directions carefully, and ask your doctor or other care provider to review them with you.         Where to get your medicines      Some of these will need a paper prescription and others can be bought over the counter.  Ask your nurse if you have questions.     Bring a paper prescription for each of these medications     amphetamine-dextroamphetamine 5 MG per tablet                Primary Care Provider Office Phone # Fax #    Etienne Mistry [phone]       [facility] [address]        Equal Access to Services     [facility]: Hadii aad ku hadasho Soomaali, waaxda luqadaha, qaybta kaalmada ademarianayaelliot, viky mehta . So [facility] [phone].    ATENCIÓN: Si habla español, tiene a qureshi disposición servicios gratuitos de asistencia lingüística. [facility] [phone].    We comply with applicable federal civil rights laws and [place] laws. We do not discriminate on the basis of race, color, national origin, age, disability, sex, sexual orientation, or gender identity.            Thank you!     Thank you for choosing [facility]  for your care. Our goal is always to provide you with excellent care. Hearing back from our patients is one way we can continue to improve our services. Please take a few minutes to complete the written survey that you may receive in the mail after your visit with us. Thank you!             Your Updated Medication List - Protect others around you: Learn how to safely use, store and throw away your medicines at www.disposemymeds.org.          This list is accurate as of: 10/9/17  4:03 PM.  Always use your most recent med list.                   Brand Name Dispense Instructions for use Diagnosis    * amphetamine-dextroamphetamine 5 MG per tablet    ADDERALL    60 tablet    Take 1 tablet (5 mg) by mouth 2 times daily    Attention deficit  hyperactivity disorder (ADHD), combined type       * amphetamine-dextroamphetamine 5 MG per tablet    ADDERALL    90 tablet    Take 1 tablet (5 mg) by mouth 3 times daily    Attention deficit hyperactivity disorder (ADHD), combined type       colestipol 1 G tablet    COLESTID    90 tablet    Take 1 tablet (1 g) by mouth daily    Crohn's disease of both small and large intestine without complication (H)       HUMIRA PEN 40 MG/0.8ML injection   Generic drug:  adalimumab           sertraline 50 MG tablet    ZOLOFT    30 tablet    Take 1 tablet (50 mg) by mouth daily    Anxiety state       * Notice:  This list has 2 medication(s) that are the same as other medications prescribed for you. Read the directions carefully, and ask your doctor or other care provider to review them with you.

## 2017-10-10 ASSESSMENT — ANXIETY QUESTIONNAIRES: GAD7 TOTAL SCORE: 0

## 2017-10-12 ENCOUNTER — TRANSFERRED RECORDS (OUTPATIENT)
Dept: HEALTH INFORMATION MANAGEMENT | Facility: CLINIC | Age: 47
End: 2017-10-12

## 2017-10-16 LAB — COMPREHEN DRUG ANALYSIS UR: NORMAL

## 2017-10-16 NOTE — PROGRESS NOTES
Jacqui Ramos ,    The results from your recent lab work are within normal limits.    - The drug screen resulted as expected.        Thank you for choosing [facility] for your health care needs,      Muna Mcclain PA-C

## 2017-11-08 DIAGNOSIS — F90.2 ATTENTION DEFICIT HYPERACTIVITY DISORDER (ADHD), COMBINED TYPE: ICD-10-CM

## 2017-11-08 NOTE — TELEPHONE ENCOUNTER
amphetamine-dextroamphetamine (ADDERALL) 5 MG per tablet      Last Written Prescription Date:  10/9/2017  Last Fill Quantity: 90 tablet,   # refills: 0  Last office visit:  10/9/2017  Future Office visit:    Next 5 appointments (look out 90 days)     Nov 10, 2017  9:00 AM CST   Return Visit with Vee Luque, PhD   [facility]    [address] [place] [id number] [phone]                   Routing refill request to provider for review/approval because:  Drug not on the [facility], [facility] or  Health refill protocol or controlled substance

## 2017-11-09 RX ORDER — DEXTROAMPHETAMINE SACCHARATE, AMPHETAMINE ASPARTATE, DEXTROAMPHETAMINE SULFATE AND AMPHETAMINE SULFATE 1.25; 1.25; 1.25; 1.25 MG/1; MG/1; MG/1; MG/1
5 TABLET ORAL 3 TIMES DAILY
Qty: 90 TABLET | Refills: 0 | Status: SHIPPED | OUTPATIENT
Start: 2017-11-09 | End: 2017-12-07

## 2017-11-09 NOTE — TELEPHONE ENCOUNTER
CSA not on file     Routing refill request to provider for review/approval because:  Drug not on the [facility] refill protocol     Georgina Lovell RN, BSN  [facility]

## 2017-11-22 DIAGNOSIS — F41.1 ANXIETY STATE: ICD-10-CM

## 2017-11-22 NOTE — TELEPHONE ENCOUNTER
Requested Prescriptions   Pending Prescriptions Disp Refills     sertraline (ZOLOFT) 50 MG tablet [Pharmacy Med Name: SERTRALINE HCL TABS 50MG] 45 tablet 3     Sig: TAKE ONE-HALF (1/2) TABLET DAILY    SSRIs Protocol Passed    11/22/2017  1:25 AM       Passed - Recent or future visit with authorizing provider    Patient had office visit in the last year or has a visit in the next 30 days with authorizing provider.  See chart review.              Passed - Medication is NOT Bupropion    If the medication is Bupropion (Wellbutrin), and the patient is taking for smoking cessation; OK to refill.         Passed - Patient is age 18 or older        LOV: 10/09/2017    PHQ-9 SCORE 8/24/2017 9/25/2017 10/9/2017   Total Score - - -   Total Score 5 4 0     JANAK-7 SCORE 8/24/2017 9/25/2017 10/9/2017   Total Score - - -   Total Score 6 5 0       Refilled per RN Protocol.     Sharon Webster RN  [facility]

## 2017-12-07 DIAGNOSIS — F90.2 ATTENTION DEFICIT HYPERACTIVITY DISORDER (ADHD), COMBINED TYPE: ICD-10-CM

## 2017-12-07 NOTE — TELEPHONE ENCOUNTER
Reason for Call:  Medication or medication refill:    Do you use a [facility]?  Name of the pharmacy and phone number for the current request:  [facility]     Name of the medication requested: amphetamine-dextroamphetamine (ADDERALL) 5 MG per tablet    Can we leave a detailed message on this number? YES    Phone number patient can be reached at: Cell number on file:    Telephone Information:   Mobile [phone]     Best Time: Anytime    Call taken on 12/7/2017 at 2:26 PM by Jocelyn Melton

## 2017-12-07 NOTE — TELEPHONE ENCOUNTER
Medication Detail         Disp Refills Start End LORENZO     amphetamine-dextroamphetamine (ADDERALL) 5 MG per tablet 90 tablet 0 11/9/2017  No     Sig: Take 1 tablet (5 mg) by mouth 3 times daily     Problem List Complete:  No     PROVIDER TO CONSIDER COMPLETION OF PROBLEM LIST AND OVERVIEW/CONTROLLED SUBSTANCE AGREEMENT    Last Office Visit with [facility] primary care provider: 10/09/2017    Future Office visit:     Controlled substance agreement on file: No.     Processing:  Patient will  in clinic    Routing refill request to provider for review/approval because:  Drug not on the [facility] refill protocol       Sharon Webster RN  [facility]

## 2017-12-08 RX ORDER — DEXTROAMPHETAMINE SACCHARATE, AMPHETAMINE ASPARTATE, DEXTROAMPHETAMINE SULFATE AND AMPHETAMINE SULFATE 1.25; 1.25; 1.25; 1.25 MG/1; MG/1; MG/1; MG/1
5 TABLET ORAL 3 TIMES DAILY
Qty: 90 TABLET | Refills: 0 | Status: SHIPPED | OUTPATIENT
Start: 2017-12-08 | End: 2018-01-02

## 2018-01-02 DIAGNOSIS — F90.2 ATTENTION DEFICIT HYPERACTIVITY DISORDER (ADHD), COMBINED TYPE: ICD-10-CM

## 2018-01-04 NOTE — TELEPHONE ENCOUNTER
amphetamine-dextroamphetamine (ADDERALL) 5 MG per tablet 90 tablet 0 12/8/2017  No   Sig: Take 1 tablet (5 mg) by mouth 3 times daily   Class: Local Print       Last Office Visit: 10/9/2017  Future Office visit:         CSA - not on file     Routing refill request to provider for review/approval because:  Drug not on the [facility], [facility] or [facility] refill protocol or controlled substance    Georgina Lovell RN, BSN  [facility]

## 2018-01-05 RX ORDER — DEXTROAMPHETAMINE SACCHARATE, AMPHETAMINE ASPARTATE, DEXTROAMPHETAMINE SULFATE AND AMPHETAMINE SULFATE 1.25; 1.25; 1.25; 1.25 MG/1; MG/1; MG/1; MG/1
5 TABLET ORAL 3 TIMES DAILY
Qty: 90 TABLET | Refills: 0 | Status: SHIPPED | OUTPATIENT
Start: 2018-01-05 | End: 2018-01-12

## 2018-01-05 NOTE — TELEPHONE ENCOUNTER
Attempt #1  Called [phone] - Left a non-detailed message to call back.    If patient calls back, please schedule a fasting physical and close encounter    Sharon Webster RN  [facility]

## 2018-01-05 NOTE — TELEPHONE ENCOUNTER
Refill done for this month, please notify patient that he is due for a followup in clinic this month for further refills.      Rx placed in Rae's bin.

## 2018-01-08 NOTE — TELEPHONE ENCOUNTER
Attempt #2  Called # below - Left a non-detailed message to call back.     If patient calls back, please schedule a fasting physical and close encounter     Sharon Webster RN  [facility]

## 2018-01-09 NOTE — TELEPHONE ENCOUNTER
Called # below - advised of JUDIE SOMERS message below  Med Check scheduled for 01/12/2018    Sharon Webster RN  [facility]

## 2018-01-12 ENCOUNTER — OFFICE VISIT (OUTPATIENT)
Dept: FAMILY MEDICINE | Facility: CLINIC | Age: 48
End: 2018-01-12
Payer: COMMERCIAL

## 2018-01-12 VITALS
BODY MASS INDEX: 26.66 KG/M2 | DIASTOLIC BLOOD PRESSURE: 80 MMHG | HEART RATE: 83 BPM | OXYGEN SATURATION: 98 % | SYSTOLIC BLOOD PRESSURE: 118 MMHG | HEIGHT: 69 IN | WEIGHT: 180 LBS | TEMPERATURE: 98.7 F

## 2018-01-12 DIAGNOSIS — F90.2 ATTENTION DEFICIT HYPERACTIVITY DISORDER (ADHD), COMBINED TYPE: ICD-10-CM

## 2018-01-12 DIAGNOSIS — F41.1 ANXIETY STATE: ICD-10-CM

## 2018-01-12 PROCEDURE — 99213 OFFICE O/P EST LOW 20 MIN: CPT | Performed by: PHYSICIAN ASSISTANT

## 2018-01-12 RX ORDER — DEXTROAMPHETAMINE SACCHARATE, AMPHETAMINE ASPARTATE, DEXTROAMPHETAMINE SULFATE AND AMPHETAMINE SULFATE 1.25; 1.25; 1.25; 1.25 MG/1; MG/1; MG/1; MG/1
5 TABLET ORAL 3 TIMES DAILY
Qty: 90 TABLET | Refills: 0 | Status: SHIPPED | OUTPATIENT
Start: 2018-03-04 | End: 2018-01-12

## 2018-01-12 RX ORDER — DEXTROAMPHETAMINE SACCHARATE, AMPHETAMINE ASPARTATE, DEXTROAMPHETAMINE SULFATE AND AMPHETAMINE SULFATE 1.25; 1.25; 1.25; 1.25 MG/1; MG/1; MG/1; MG/1
5 TABLET ORAL 3 TIMES DAILY
Qty: 90 TABLET | Refills: 0 | Status: SHIPPED | OUTPATIENT
Start: 2018-02-04 | End: 2018-01-12

## 2018-01-12 RX ORDER — DEXTROAMPHETAMINE SACCHARATE, AMPHETAMINE ASPARTATE, DEXTROAMPHETAMINE SULFATE AND AMPHETAMINE SULFATE 1.25; 1.25; 1.25; 1.25 MG/1; MG/1; MG/1; MG/1
5 TABLET ORAL 3 TIMES DAILY
Qty: 90 TABLET | Refills: 0 | Status: SHIPPED | OUTPATIENT
Start: 2018-04-03 | End: 2018-04-17

## 2018-01-12 ASSESSMENT — ANXIETY QUESTIONNAIRES
3. WORRYING TOO MUCH ABOUT DIFFERENT THINGS: NOT AT ALL
6. BECOMING EASILY ANNOYED OR IRRITABLE: NOT AT ALL
GAD7 TOTAL SCORE: 0
2. NOT BEING ABLE TO STOP OR CONTROL WORRYING: NOT AT ALL
5. BEING SO RESTLESS THAT IT IS HARD TO SIT STILL: NOT AT ALL
1. FEELING NERVOUS, ANXIOUS, OR ON EDGE: NOT AT ALL
7. FEELING AFRAID AS IF SOMETHING AWFUL MIGHT HAPPEN: NOT AT ALL

## 2018-01-12 ASSESSMENT — PATIENT HEALTH QUESTIONNAIRE - PHQ9
5. POOR APPETITE OR OVEREATING: NOT AT ALL
SUM OF ALL RESPONSES TO PHQ QUESTIONS 1-9: 0

## 2018-01-12 NOTE — MR AVS SNAPSHOT
After Visit Summary   1/12/2018    Richard Loza    MRN: [id number]           Patient Information     Date Of Birth          1970        Visit Information        Provider Department      1/12/2018 2:20 PM Muna Mcclain PA-C [facility]        Today's Diagnoses     Anxiety state        Attention deficit hyperactivity disorder (ADHD), combined type          Care Instructions    Please followup in about 6 months, or sooner if needed.     Please call one week before needing the refills of the Adderall.            Follow-ups after your visit        Who to contact     If you have questions or need follow up information about today's clinic visit or your schedule please contact [facility] directly at [phone].  Normal or non-critical lab and imaging results will be communicated to you by MyChart, letter or phone within 4 business days after the clinic has received the results. If you do not hear from us within 7 days, please contact the clinic through Art[employer] or phone. If you have a critical or abnormal lab result, we will notify you by phone as soon as possible.  Submit refill requests through Dr. Tariff or call your pharmacy and they will forward the refill request to us. Please allow 3 business days for your refill to be completed.          Additional Information About Your Visit        MyChart Information     Dr. Tariff gives you secure access to your electronic health record. If you see a primary care provider, you can also send messages to your care team and make appointments. If you have questions, please call your primary care clinic.  If you do not have a primary care provider, please call [phone] and they will assist you.        Care EveryWhere ID     This is your Care EveryWhere ID. This could be used by other organizations to access your [facility] medical records  [id number]        Your Vitals Were     Pulse Temperature Height Pulse Oximetry BMI  "(Body Mass Index)       83 98.7  F (37.1  C) (Oral) 5' 9\" (1.753 m) 98% 26.58 kg/m2        Blood Pressure from Last 3 Encounters:   01/12/18 118/80   10/09/17 120/70   09/25/17 106/78    Weight from Last 3 Encounters:   01/12/18 180 lb (81.6 kg)   10/09/17 180 lb (81.6 kg)   09/25/17 184 lb (83.5 kg)              Today, you had the following     No orders found for display         Today's Medication Changes          These changes are accurate as of: 1/12/18  2:53 PM.  If you have any questions, ask your nurse or doctor.               Start taking these medicines.        Dose/Directions    amphetamine-dextroamphetamine 5 MG per tablet   Commonly known as:  ADDERALL   Used for:  Attention deficit hyperactivity disorder (ADHD), combined type   Started by:  Muna Mcclain PA-C        Dose:  5 mg   Start taking on:  4/3/2018   Take 1 tablet (5 mg) by mouth 3 times daily   Quantity:  90 tablet   Refills:  0       sertraline 50 MG tablet   Commonly known as:  ZOLOFT   Used for:  Anxiety state   Started by:  Muna Mcclain PA-C        Dose:  50 mg   Start taking on:  4/3/2018   Take 1 tablet (50 mg) by mouth daily   Quantity:  90 tablet   Refills:  3            Where to get your medicines      These medications were sent to [facility] [place] [id number] [facility] [address]     Phone:  [phone]     sertraline 50 MG tablet         Some of these will need a paper prescription and others can be bought over the counter.  Ask your nurse if you have questions.     Bring a paper prescription for each of these medications     amphetamine-dextroamphetamine 5 MG per tablet                Primary Care Provider Office Phone # Fax #    Muna Mcclain PA-C [phone]       [facility] [address] [address]        Equal Access to Services     AMOL GARZON AH: Hadii tor royal Soclarice, waaxda luqadaha, qaybta vandana ricci, " viky pollackmariana farias'aan ah. So [facility] [phone].    ATENCIÓN: Si habla radhika, tiene a qureshi disposición servicios gratuitos de asistencia lingüística. Howie mccullough [phone].    We comply with applicable federal civil rights laws and [place] laws. We do not discriminate on the basis of race, color, national origin, age, disability, sex, sexual orientation, or gender identity.            Thank you!     Thank you for choosing [facility]  for your care. Our goal is always to provide you with excellent care. Hearing back from our patients is one way we can continue to improve our services. Please take a few minutes to complete the written survey that you may receive in the mail after your visit with us. Thank you!             Your Updated Medication List - Protect others around you: Learn how to safely use, store and throw away your medicines at www.disposemymeds.org.          This list is accurate as of: 1/12/18  2:53 PM.  Always use your most recent med list.                   Brand Name Dispense Instructions for use Diagnosis    amphetamine-dextroamphetamine 5 MG per tablet   Start taking on:  4/3/2018    ADDERALL    90 tablet    Take 1 tablet (5 mg) by mouth 3 times daily    Attention deficit hyperactivity disorder (ADHD), combined type       colestipol 1 G tablet    COLESTID    90 tablet    Take 1 tablet (1 g) by mouth daily    Crohn's disease of both small and large intestine without complication (H)       HUMIRA PEN 40 MG/0.8ML injection   Generic drug:  adalimumab           sertraline 50 MG tablet   Start taking on:  4/3/2018    ZOLOFT    90 tablet    Take 1 tablet (50 mg) by mouth daily    Anxiety state

## 2018-01-12 NOTE — PATIENT INSTRUCTIONS
Please followup in about 6 months, or sooner if needed.     Please call one week before needing the refills of the Adderall.

## 2018-01-12 NOTE — NURSING NOTE
"Chief Complaint   Patient presents with     Recheck Medication       Initial /80 (BP Location: Left arm, Patient Position: Chair, Cuff Size: Adult Regular)  Pulse 83  Temp 98.7  F (37.1  C) (Oral)  Ht 5' 9\" (1.753 m)  Wt 180 lb (81.6 kg)  SpO2 98%  BMI 26.58 kg/m2 Estimated body mass index is 26.58 kg/(m^2) as calculated from the following:    Height as of this encounter: 5' 9\" (1.753 m).    Weight as of this encounter: 180 lb (81.6 kg).  Medication Reconciliation: complete   Csaba Mlnarik CMA    "

## 2018-01-13 ASSESSMENT — ANXIETY QUESTIONNAIRES: GAD7 TOTAL SCORE: 0

## 2018-02-28 ENCOUNTER — TRANSFERRED RECORDS (OUTPATIENT)
Dept: HEALTH INFORMATION MANAGEMENT | Facility: CLINIC | Age: 48
End: 2018-02-28

## 2018-04-17 DIAGNOSIS — F90.2 ATTENTION DEFICIT HYPERACTIVITY DISORDER (ADHD), COMBINED TYPE: ICD-10-CM

## 2018-04-17 RX ORDER — DEXTROAMPHETAMINE SACCHARATE, AMPHETAMINE ASPARTATE, DEXTROAMPHETAMINE SULFATE AND AMPHETAMINE SULFATE 1.25; 1.25; 1.25; 1.25 MG/1; MG/1; MG/1; MG/1
5 TABLET ORAL 3 TIMES DAILY
Qty: 90 TABLET | Refills: 0 | Status: SHIPPED | OUTPATIENT
Start: 2018-05-03 | End: 2018-06-01

## 2018-04-17 NOTE — TELEPHONE ENCOUNTER
Controlled Substance Refill Request for      Disp Refills Start End LORENZO   amphetamine-dextroamphetamine (ADDERALL) 5 MG per tablet 90 tablet 0 4/3/2018  No   Sig: Take 1 tablet (5 mg) by mouth 3 times daily       Problem List Complete:  No     PROVIDER TO CONSIDER COMPLETION OF PROBLEM LIST AND OVERVIEW/CONTROLLED SUBSTANCE AGREEMENT      Last Office Visit with [facility] primary care provider: 1/12/2018    Future Office visit:     Controlled substance agreement on file: No.     Processing:  Patient will  in clinic   checked in past 6 months?  No, route to RN      Georgina Lovell RN, BSN  [facility]

## 2018-04-17 NOTE — TELEPHONE ENCOUNTER
Reason for Call:  Medication or medication refill:    Do you use a [facility]?  Name of the pharmacy and phone number for the current request:      Name of the medication requested: amphetamine-dextroamphetamine (ADDERALL) 5 MG per tablet    Other request: no    Can we leave a detailed message on this number? YES    Phone number patient can be reached at: Cell number on file:    Telephone Information:   Mobile [phone]       Best Time: anytime    Call taken on 4/17/2018 at 2:36 PM by Kellie Salcedo

## 2018-06-01 DIAGNOSIS — F90.2 ATTENTION DEFICIT HYPERACTIVITY DISORDER (ADHD), COMBINED TYPE: ICD-10-CM

## 2018-06-01 RX ORDER — DEXTROAMPHETAMINE SACCHARATE, AMPHETAMINE ASPARTATE, DEXTROAMPHETAMINE SULFATE AND AMPHETAMINE SULFATE 1.25; 1.25; 1.25; 1.25 MG/1; MG/1; MG/1; MG/1
5 TABLET ORAL 3 TIMES DAILY
Qty: 90 TABLET | Refills: 0 | Status: SHIPPED | OUTPATIENT
Start: 2018-06-02 | End: 2018-06-19

## 2018-06-01 NOTE — LETTER
[facility]  [address] [place] [id number] [phone]  June 12, 2018    Richard Loza  [address] [address]    Dear Richard,    I care about your health and have reviewed your health plan. I have reviewed your medical conditions, medication list, and lab results and am making recommendations based on this review, to better manage your health.    You are in particular need of attention regarding:  -medication check    I am recommending that you:  -schedule a FOLLOWUP OFFICE APPOINTMENT with me.       Here is a list of Health Maintenance topics that are due now or due soon:  Health Maintenance Due   Topic Date Due     HIV SCREEN (SYSTEM ASSIGNED)  03/14/1989     Prostate Test (PSA) - yearly  10/18/2017     Wellness Visit with your Primary Provider - yearly  10/18/2017       Please call us at [phone] (or use CITYBIZLIST) to address the above recommendations.     Thank you for trusting [facility] and we appreciate the opportunity to serve you.  We look forward to supporting your healthcare needs in the future.    Healthy Regards,        Muna Mcclain PA-C

## 2018-06-01 NOTE — TELEPHONE ENCOUNTER
Controlled Substance Refill Request for amphetamine-dextroamphetamine (ADDERALL) 5 MG per tablet  Problem List Complete:  Yes    Last Written Prescription Date:  5.3.18  Last Fill Quantity: 90,   # refills: 0    Last Office Visit with [facility] primary care provider: 1.12.18    Clinic visit frequency required:      Future Office visit:     Controlled substance agreement on file: No.     Processing:  Fax Rx to listed pharmacy   checked in past 6 months?  No, route to RN

## 2018-06-01 NOTE — TELEPHONE ENCOUNTER
Rx renewed today.  Placed in Rae's basket.      Please inform patient he is due for followup in July.

## 2018-06-02 NOTE — TELEPHONE ENCOUNTER
Walked Rx to [facility].  Left non-detailed message for patient to call back.  Please schedule follow up when patient calls back.  (see previous notes for details)    Rae Jimenez

## 2018-06-12 NOTE — TELEPHONE ENCOUNTER
Second attempt - Left non-detailed message for patient to call back.  Please schedule follow up when patient calls back.  (see previous notes for details)  Letter sent.  Closing encounter.    Rae Jimenez

## 2018-06-19 ENCOUNTER — OFFICE VISIT (OUTPATIENT)
Dept: FAMILY MEDICINE | Facility: CLINIC | Age: 48
End: 2018-06-19
Payer: COMMERCIAL

## 2018-06-19 VITALS
WEIGHT: 186 LBS | HEART RATE: 77 BPM | SYSTOLIC BLOOD PRESSURE: 110 MMHG | BODY MASS INDEX: 27.55 KG/M2 | DIASTOLIC BLOOD PRESSURE: 76 MMHG | HEIGHT: 69 IN | TEMPERATURE: 97.9 F | OXYGEN SATURATION: 100 %

## 2018-06-19 DIAGNOSIS — F41.1 ANXIETY STATE: ICD-10-CM

## 2018-06-19 DIAGNOSIS — Z79.899 CONTROLLED SUBSTANCE AGREEMENT SIGNED: ICD-10-CM

## 2018-06-19 DIAGNOSIS — F90.2 ATTENTION DEFICIT HYPERACTIVITY DISORDER (ADHD), COMBINED TYPE: Primary | ICD-10-CM

## 2018-06-19 PROCEDURE — 99213 OFFICE O/P EST LOW 20 MIN: CPT | Performed by: PHYSICIAN ASSISTANT

## 2018-06-19 RX ORDER — DEXTROAMPHETAMINE SACCHARATE, AMPHETAMINE ASPARTATE, DEXTROAMPHETAMINE SULFATE AND AMPHETAMINE SULFATE 1.25; 1.25; 1.25; 1.25 MG/1; MG/1; MG/1; MG/1
TABLET ORAL
Qty: 120 TABLET | Refills: 0 | Status: SHIPPED | OUTPATIENT
Start: 2018-07-02 | End: 2018-07-23

## 2018-06-19 RX ORDER — DEXTROAMPHETAMINE SACCHARATE, AMPHETAMINE ASPARTATE, DEXTROAMPHETAMINE SULFATE AND AMPHETAMINE SULFATE 1.25; 1.25; 1.25; 1.25 MG/1; MG/1; MG/1; MG/1
5 TABLET ORAL 3 TIMES DAILY
Qty: 90 TABLET | Refills: 0 | Status: SHIPPED | OUTPATIENT
Start: 2018-07-02 | End: 2018-06-19 | Stop reason: DRUGHIGH

## 2018-06-19 NOTE — MR AVS SNAPSHOT
After Visit Summary   6/19/2018    Richard Loza    MRN: [id number]           Patient Information     Date Of Birth          1970        Visit Information        Provider Department      6/19/2018 8:00 AM Muna Mcclain PA-C [facility]        Today's Diagnoses     Attention deficit hyperactivity disorder (ADHD), combined type    -  1    Controlled substance agreement signed - 10.17.2017        Anxiety state           Follow-ups after your visit        Follow-up notes from your care team     Return in about 4 weeks (around 7/17/2018) for medication re-check, please be seen sooner if needed, Physical Exam.      Your next 10 appointments already scheduled     Jul 16, 2018  8:00 AM CDT   [facility] with Muna Mcclain PA-C   [facility]    [address] [place] [phone]              Who to contact     If you have questions or need follow up information about today's clinic visit or your schedule please contact [facility] directly at [phone].  Normal or non-critical lab and imaging results will be communicated to you by MET Techhart, letter or phone within 4 business days after the clinic has received the results. If you do not hear from us within 7 days, please contact the clinic through [employer] or phone. If you have a critical or abnormal lab result, we will notify you by phone as soon as possible.  Submit refill requests through [employer] or call your pharmacy and they will forward the refill request to us. Please allow 3 business days for your refill to be completed.          Additional Information About Your Visit        MET Techhart Information     [employer] gives you secure access to your electronic health record. If you see a primary care provider, you can also send messages to your care team and make appointments. If you have questions, please call your primary care clinic.   "If you do not have a primary care provider, please call [phone] and they will assist you.        Care EveryWhere ID     This is your Care EveryWhere ID. This could be used by other organizations to access your [facility] medical records  [id number]        Your Vitals Were     Pulse Temperature Height Pulse Oximetry BMI (Body Mass Index)       77 97.9  F (36.6  C) (Oral) 5' 9\" (1.753 m) 100% 27.47 kg/m2        Blood Pressure from Last 3 Encounters:   06/19/18 110/76   01/12/18 118/80   10/09/17 120/70    Weight from Last 3 Encounters:   06/19/18 186 lb (84.4 kg)   01/12/18 180 lb (81.6 kg)   10/09/17 180 lb (81.6 kg)              Today, you had the following     No orders found for display         Today's Medication Changes          These changes are accurate as of 6/19/18 11:35 PM.  If you have any questions, ask your nurse or doctor.               These medicines have changed or have updated prescriptions.        Dose/Directions    amphetamine-dextroamphetamine 5 MG per tablet   Commonly known as:  ADDERALL   This may have changed:    - how much to take  - how to take this  - when to take this  - additional instructions  - These instructions start on 7/2/2018. If you are unsure what to do until then, ask your doctor or other care provider.   Used for:  Attention deficit hyperactivity disorder (ADHD), combined type, Controlled substance agreement signed   Changed by:  Muna Mcclain PA-C        Start taking on:  7/2/2018   Take 2 tablets in the morning, then 1 tab around noon, and 1 tab in the afternoon.   Quantity:  120 tablet   Refills:  0            Where to get your medicines      Some of these will need a paper prescription and others can be bought over the counter.  Ask your nurse if you have questions.     Bring a paper prescription for each of these medications     amphetamine-dextroamphetamine 5 MG per tablet                Primary Care Provider Office Phone # Fax #    Muna Mcclain PA-C " [phone]       [address] [facility]   [address]  [facility] [address]        Equal Access to Services     AMOL GARZON : Hadii tor paz lele Moreno, wagamada luqmatteo, qaybta kaalmada radhames, viky kimbroughbon etienne. So [facility] [phone].    ATENCIÓN: Si habla español, tiene a qureshi disposición servicios gratuitos de asistencia lingüística. Llame al [phone].    We comply with applicable federal civil rights laws and [place] laws. We do not discriminate on the basis of race, color, national origin, age, disability, sex, sexual orientation, or gender identity.            Thank you!     Thank you for choosing [facility]  for your care. Our goal is always to provide you with excellent care. Hearing back from our patients is one way we can continue to improve our services. Please take a few minutes to complete the written survey that you may receive in the mail after your visit with us. Thank you!             Your Updated Medication List - Protect others around you: Learn how to safely use, store and throw away your medicines at www.disposemymeds.org.          This list is accurate as of 6/19/18 11:35 PM.  Always use your most recent med list.                   Brand Name Dispense Instructions for use Diagnosis    amphetamine-dextroamphetamine 5 MG per tablet   Start taking on:  7/2/2018    ADDERALL    120 tablet    Take 2 tablets in the morning, then 1 tab around noon, and 1 tab in the afternoon.    Attention deficit hyperactivity disorder (ADHD), combined type, Controlled substance agreement signed       colestipol 1 g tablet    COLESTID    90 tablet    Take 1 tablet (1 g) by mouth daily    Crohn's disease of both small and large intestine without complication (H)       HUMIRA PEN 40 MG/0.8ML injection   Generic drug:  adalimumab           sertraline 50 MG tablet    ZOLOFT    90 tablet    Take 1 tablet (50 mg) by mouth daily    Anxiety state

## 2018-06-19 NOTE — PROGRESS NOTES
"  SUBJECTIVE:                                                    Richard Loza is a 48 year old male who presents to clinic today for the following health issues:      Medication Followup of Adderall 5mg    Taking Medication as prescribed: yes    Side Effects:  None    Medication Helping Symptoms:  yes    Patient reports that he is doing well on his medication.  He does not have any concerns of side effects or symptoms relating to the medication and he reports to tolerating it well.   He does feel as though he could use a little better support by the end of the day.  He reports that he takes the first pill around 7-8 and then the next one around 10-11 and then the last one by 1-3.  He says if he takes it earlier, then he can feel it wearing off still during his work day.      He is taking his sertraline for the anxiety and feeling stable and well controlled on that.      Problem list and histories reviewed & adjusted, as indicated.  Additional history: as documented      ROS:  Constitutional, HEENT, cardiovascular, pulmonary, GI, , musculoskeletal, neuro, skin, endocrine and psych systems are negative, except as otherwise noted.    OBJECTIVE:                                                    /76 (BP Location: Left arm, Patient Position: Chair, Cuff Size: Adult Large)  Pulse 77  Temp 97.9  F (36.6  C) (Oral)  Ht 5' 9\" (1.753 m)  Wt 186 lb (84.4 kg)  SpO2 100%  BMI 27.47 kg/m2  Body mass index is 27.47 kg/(m^2).  GENERAL: healthy, alert and no distress  EYES: Eyes grossly normal to inspection, PERRL and conjunctivae and sclerae normal  RESP: lungs clear to auscultation - no rales, rhonchi or wheezes  CV: regular rate and rhythm, normal S1 S2, no S3 or S4, no murmur, click or rub, no peripheral edema and peripheral pulses strong  MS: no gross musculoskeletal defects noted, no edema  NEURO: Normal strength and tone, mentation intact and speech normal  PSYCH: mentation appears normal, affect " normal/bright    Diagnostic Test Results:  none      ASSESSMENT/PLAN:                                                      Richard was seen today for recheck medication.    Diagnoses and all orders for this visit:    Attention deficit hyperactivity disorder (ADHD), combined type  -     Discontinue: amphetamine-dextroamphetamine (ADDERALL) 5 MG per tablet; Take 1 tablet (5 mg) by mouth 3 times daily  -     amphetamine-dextroamphetamine (ADDERALL) 5 MG per tablet; Take 2 tablets in the morning, then 1 tab around noon, and 1 tab in the afternoon.    Controlled substance agreement signed - 10.17.2017  -     amphetamine-dextroamphetamine (ADDERALL) 5 MG per tablet; Take 2 tablets in the morning, then 1 tab around noon, and 1 tab in the afternoon.    Anxiety state      - Rx done to increase the morning dose to 10 mg, which should allow the patient to not take his second pill until about noon therefore pushing his afternoon dose later as well to give him better coverage during the pm hours of his work day.   - Patient advised to followup for a dose check within the month for further refills.  He has a physical scheduled in July, and will address the medication change at that time, or sooner if needed.     -- I have discussed the patient's diagnosis, and my plan of treatment with the patient and/or family. Patient is aware to followup if symptoms do not improve.  Patient has been advised to be seen sooner or seek more immediate care if symptoms change or worsen.  Patient agrees with and understands the plan today.     See Patient Instructions        Muna Mcclain PA-C    [facility]

## 2018-07-23 ENCOUNTER — OFFICE VISIT (OUTPATIENT)
Dept: FAMILY MEDICINE | Facility: CLINIC | Age: 48
End: 2018-07-23
Payer: COMMERCIAL

## 2018-07-23 VITALS
TEMPERATURE: 98.4 F | WEIGHT: 186 LBS | OXYGEN SATURATION: 97 % | DIASTOLIC BLOOD PRESSURE: 86 MMHG | BODY MASS INDEX: 27.55 KG/M2 | HEART RATE: 90 BPM | SYSTOLIC BLOOD PRESSURE: 124 MMHG | HEIGHT: 69 IN

## 2018-07-23 DIAGNOSIS — Z00.00 ROUTINE GENERAL MEDICAL EXAMINATION AT A HEALTH CARE FACILITY: Primary | ICD-10-CM

## 2018-07-23 DIAGNOSIS — Z12.5 SCREENING FOR PROSTATE CANCER: ICD-10-CM

## 2018-07-23 DIAGNOSIS — Z83.3 FAMILY HISTORY OF DIABETES MELLITUS: ICD-10-CM

## 2018-07-23 DIAGNOSIS — Z13.0 SCREENING FOR DEFICIENCY ANEMIA: ICD-10-CM

## 2018-07-23 DIAGNOSIS — Z11.4 SCREENING FOR HIV (HUMAN IMMUNODEFICIENCY VIRUS): ICD-10-CM

## 2018-07-23 DIAGNOSIS — F90.2 ATTENTION DEFICIT HYPERACTIVITY DISORDER (ADHD), COMBINED TYPE: ICD-10-CM

## 2018-07-23 DIAGNOSIS — F41.1 ANXIETY STATE: ICD-10-CM

## 2018-07-23 DIAGNOSIS — Z13.6 CARDIOVASCULAR SCREENING; LDL GOAL LESS THAN 160: ICD-10-CM

## 2018-07-23 DIAGNOSIS — Z79.899 CONTROLLED SUBSTANCE AGREEMENT SIGNED: ICD-10-CM

## 2018-07-23 LAB
ALBUMIN SERPL-MCNC: 4 G/DL (ref 3.4–5)
ALP SERPL-CCNC: 115 U/L (ref 40–150)
ALT SERPL W P-5'-P-CCNC: 33 U/L (ref 0–70)
ANION GAP SERPL CALCULATED.3IONS-SCNC: 6 MMOL/L (ref 3–14)
AST SERPL W P-5'-P-CCNC: 23 U/L (ref 0–45)
BASOPHILS # BLD AUTO: 0.1 10E9/L (ref 0–0.2)
BASOPHILS NFR BLD AUTO: 0.9 %
BILIRUB SERPL-MCNC: 0.6 MG/DL (ref 0.2–1.3)
BUN SERPL-MCNC: 18 MG/DL (ref 7–30)
CALCIUM SERPL-MCNC: 8.3 MG/DL (ref 8.5–10.1)
CHLORIDE SERPL-SCNC: 105 MMOL/L (ref 94–109)
CHOLEST SERPL-MCNC: 161 MG/DL
CO2 SERPL-SCNC: 27 MMOL/L (ref 20–32)
CREAT SERPL-MCNC: 1.08 MG/DL (ref 0.66–1.25)
DIFFERENTIAL METHOD BLD: NORMAL
EOSINOPHIL # BLD AUTO: 0.2 10E9/L (ref 0–0.7)
EOSINOPHIL NFR BLD AUTO: 3.4 %
ERYTHROCYTE [DISTWIDTH] IN BLOOD BY AUTOMATED COUNT: 11.7 % (ref 10–15)
GFR SERPL CREATININE-BSD FRML MDRD: 73 ML/MIN/1.7M2
GLUCOSE SERPL-MCNC: 107 MG/DL (ref 70–99)
HCT VFR BLD AUTO: 47.6 % (ref 40–53)
HDLC SERPL-MCNC: 35 MG/DL
HGB BLD-MCNC: 16.5 G/DL (ref 13.3–17.7)
LDLC SERPL CALC-MCNC: ABNORMAL MG/DL
LDLC SERPL DIRECT ASSAY-MCNC: 85 MG/DL
LYMPHOCYTES # BLD AUTO: 2.2 10E9/L (ref 0.8–5.3)
LYMPHOCYTES NFR BLD AUTO: 31.9 %
MCH RBC QN AUTO: 30.8 PG (ref 26.5–33)
MCHC RBC AUTO-ENTMCNC: 34.7 G/DL (ref 31.5–36.5)
MCV RBC AUTO: 89 FL (ref 78–100)
MONOCYTES # BLD AUTO: 0.6 10E9/L (ref 0–1.3)
MONOCYTES NFR BLD AUTO: 8.5 %
NEUTROPHILS # BLD AUTO: 3.8 10E9/L (ref 1.6–8.3)
NEUTROPHILS NFR BLD AUTO: 55.3 %
NONHDLC SERPL-MCNC: 126 MG/DL
PLATELET # BLD AUTO: 236 10E9/L (ref 150–450)
POTASSIUM SERPL-SCNC: 4.3 MMOL/L (ref 3.4–5.3)
PROT SERPL-MCNC: 7.9 G/DL (ref 6.8–8.8)
PSA SERPL-ACNC: 0.54 UG/L (ref 0–4)
RBC # BLD AUTO: 5.35 10E12/L (ref 4.4–5.9)
SODIUM SERPL-SCNC: 138 MMOL/L (ref 133–144)
TRIGL SERPL-MCNC: 602 MG/DL
WBC # BLD AUTO: 6.8 10E9/L (ref 4–11)

## 2018-07-23 PROCEDURE — 83721 ASSAY OF BLOOD LIPOPROTEIN: CPT | Mod: 59 | Performed by: PHYSICIAN ASSISTANT

## 2018-07-23 PROCEDURE — 36415 COLL VENOUS BLD VENIPUNCTURE: CPT | Performed by: PHYSICIAN ASSISTANT

## 2018-07-23 PROCEDURE — 99396 PREV VISIT EST AGE 40-64: CPT | Performed by: PHYSICIAN ASSISTANT

## 2018-07-23 PROCEDURE — G0103 PSA SCREENING: HCPCS | Performed by: PHYSICIAN ASSISTANT

## 2018-07-23 PROCEDURE — 85025 COMPLETE CBC W/AUTO DIFF WBC: CPT | Performed by: PHYSICIAN ASSISTANT

## 2018-07-23 PROCEDURE — 80061 LIPID PANEL: CPT | Performed by: PHYSICIAN ASSISTANT

## 2018-07-23 PROCEDURE — 80053 COMPREHEN METABOLIC PANEL: CPT | Performed by: PHYSICIAN ASSISTANT

## 2018-07-23 RX ORDER — DEXTROAMPHETAMINE SACCHARATE, AMPHETAMINE ASPARTATE, DEXTROAMPHETAMINE SULFATE AND AMPHETAMINE SULFATE 1.25; 1.25; 1.25; 1.25 MG/1; MG/1; MG/1; MG/1
TABLET ORAL
Qty: 120 TABLET | Refills: 0 | Status: SHIPPED | OUTPATIENT
Start: 2018-09-01 | End: 2018-07-23

## 2018-07-23 RX ORDER — DEXTROAMPHETAMINE SACCHARATE, AMPHETAMINE ASPARTATE, DEXTROAMPHETAMINE SULFATE AND AMPHETAMINE SULFATE 1.25; 1.25; 1.25; 1.25 MG/1; MG/1; MG/1; MG/1
TABLET ORAL
Qty: 120 TABLET | Refills: 0 | Status: SHIPPED | OUTPATIENT
Start: 2018-08-01 | End: 2018-07-23

## 2018-07-23 RX ORDER — DEXTROAMPHETAMINE SACCHARATE, AMPHETAMINE ASPARTATE, DEXTROAMPHETAMINE SULFATE AND AMPHETAMINE SULFATE 1.25; 1.25; 1.25; 1.25 MG/1; MG/1; MG/1; MG/1
TABLET ORAL
Qty: 120 TABLET | Refills: 0 | Status: SHIPPED | OUTPATIENT
Start: 2018-10-01 | End: 2018-10-31

## 2018-07-23 ASSESSMENT — ANXIETY QUESTIONNAIRES
2. NOT BEING ABLE TO STOP OR CONTROL WORRYING: NOT AT ALL
6. BECOMING EASILY ANNOYED OR IRRITABLE: NOT AT ALL
7. FEELING AFRAID AS IF SOMETHING AWFUL MIGHT HAPPEN: NOT AT ALL
1. FEELING NERVOUS, ANXIOUS, OR ON EDGE: NOT AT ALL
3. WORRYING TOO MUCH ABOUT DIFFERENT THINGS: NOT AT ALL
5. BEING SO RESTLESS THAT IT IS HARD TO SIT STILL: NOT AT ALL
GAD7 TOTAL SCORE: 0

## 2018-07-23 ASSESSMENT — PATIENT HEALTH QUESTIONNAIRE - PHQ9: 5. POOR APPETITE OR OVEREATING: NOT AT ALL

## 2018-07-23 NOTE — MR AVS SNAPSHOT
After Visit Summary   7/23/2018    Ricahrd Loza    MRN: [id number]           Patient Information     Date Of Birth          1970        Visit Information        Provider Department      7/23/2018 9:20 AM Muna Mcclain PA-C [facility] [place]        Today's Diagnoses     Routine general medical examination at a health care facility    -  1    Screening for prostate cancer        Screening for HIV (human immunodeficiency virus)        Family history of diabetes mellitus        Anxiety state        CARDIOVASCULAR SCREENING; LDL GOAL LESS THAN 160        Attention deficit hyperactivity disorder (ADHD), combined type        Screening for deficiency anemia        Controlled substance agreement signed - 10.17.2017          Care Instructions      Preventive Health Recommendations  Male Ages 40 to 49    Yearly exam:             See your health care provider every year in order to  o   Review health changes.   o   Discuss preventive care.    o   Review your medicines if your doctor has prescribed any.    You should be tested each year for STDs (sexually transmitted diseases) if you re at risk.     Have a cholesterol test every 5 years.     Have a colonoscopy (test for colon cancer) if someone in your family has had colon cancer or polyps before age 50.     After age 45, have a diabetes test (fasting glucose). If you are at risk for diabetes, you should have this test every 3 years.      Talk with your health care provider about whether or not a prostate cancer screening test (PSA) is right for you.    Shots: Get a flu shot each year. Get a tetanus shot every 10 years.     Nutrition:    Eat at least 5 servings of fruits and vegetables daily.     Eat whole-grain bread, whole-wheat pasta and brown rice instead of white grains and rice.     Get adequate Calcium and Vitamin D.     Lifestyle    Exercise for at least 150 minutes a week (30 minutes a day, 5 days a week). This will help you control  "your weight and prevent disease.     Limit alcohol to one drink per day.     No smoking.     Wear sunscreen to prevent skin cancer.     See your dentist every six months for an exam and cleaning.              Follow-ups after your visit        Follow-up notes from your care team     Return in about 6 months (around 1/23/2019) for medication re-check, please be seen sooner if needed.      Who to contact     If you have questions or need follow up information about today's clinic visit or your schedule please contact [facility] directly at [phone].  Normal or non-critical lab and imaging results will be communicated to you by [employer], letter or phone within 4 business days after the clinic has received the results. If you do not hear from us within 7 days, please contact the clinic through [employer] or phone. If you have a critical or abnormal lab result, we will notify you by phone as soon as possible.  Submit refill requests through [employer] or call your pharmacy and they will forward the refill request to us. Please allow 3 business days for your refill to be completed.          Additional Information About Your Visit        [employer] Information     [employer] gives you secure access to your electronic health record. If you see a primary care provider, you can also send messages to your care team and make appointments. If you have questions, please call your primary care clinic.  If you do not have a primary care provider, please call [phone] and they will assist you.        Care EveryWhere ID     This is your Care EveryWhere ID. This could be used by other organizations to access your [facility] medical records  [id number]        Your Vitals Were     Pulse Temperature Height Pulse Oximetry BMI (Body Mass Index)       90 98.4  F (36.9  C) (Oral) 5' 9\" (1.753 m) 97% 27.47 kg/m2        Blood Pressure from Last 3 Encounters:   07/23/18 124/86   06/19/18 110/76   01/12/18 118/80    Weight from Last " 3 Encounters:   07/23/18 186 lb (84.4 kg)   06/19/18 186 lb (84.4 kg)   01/12/18 180 lb (81.6 kg)              We Performed the Following     CBC with platelets and differential     Comprehensive metabolic panel (BMP + Alb, Alk Phos, ALT, AST, Total. Bili, TP)     Lipid panel reflex to direct LDL Fasting     PROSTATE SPEC ANTIGEN SCREEN          Today's Medication Changes          These changes are accurate as of 7/23/18 10:04 AM.  If you have any questions, ask your nurse or doctor.               Start taking these medicines.        Dose/Directions    amphetamine-dextroamphetamine 5 MG per tablet   Commonly known as:  ADDERALL   Used for:  Attention deficit hyperactivity disorder (ADHD), combined type, Controlled substance agreement signed   Started by:  Muna Mcclain PA-C        Start taking on:  10/1/2018   Take 2 tablets in the morning, then 1 tab around noon, and 1 tab in the afternoon.   Quantity:  120 tablet   Refills:  0            Where to get your medicines      Some of these will need a paper prescription and others can be bought over the counter.  Ask your nurse if you have questions.     Bring a paper prescription for each of these medications     amphetamine-dextroamphetamine 5 MG per tablet                Primary Care Provider Office Phone # Fax #    Muna Mcclain PA-C [phone]       [address] [place] [address]        Equal Access to Services     [facility] DURAN AH: Hadii tor paz hadasho Soomaali, waaxda luqadaha, qaybta kaalmada adeegyada, waxay lisa hayshreyas clifton. So [facility] [phone].    ATENCIÓN: Si habla español, tiene a qureshi disposición servicios gratuitos de asistencia lingüística. Llame al [phone].    We comply with applicable federal civil rights laws and [place] laws. We do not discriminate on the basis of race, color, national origin, age, disability, sex, sexual orientation, or gender identity.            Thank you!     Thank  you for choosing [facility]  for your care. Our goal is always to provide you with excellent care. Hearing back from our patients is one way we can continue to improve our services. Please take a few minutes to complete the written survey that you may receive in the mail after your visit with us. Thank you!             Your Updated Medication List - Protect others around you: Learn how to safely use, store and throw away your medicines at www.disposemymeds.org.          This list is accurate as of 7/23/18 10:04 AM.  Always use your most recent med list.                   Brand Name Dispense Instructions for use Diagnosis    amphetamine-dextroamphetamine 5 MG per tablet   Start taking on:  10/1/2018    ADDERALL    120 tablet    Take 2 tablets in the morning, then 1 tab around noon, and 1 tab in the afternoon.    Attention deficit hyperactivity disorder (ADHD), combined type, Controlled substance agreement signed       colestipol 1 g tablet    COLESTID    90 tablet    Take 1 tablet (1 g) by mouth daily    Crohn's disease of both small and large intestine without complication (H)       HUMIRA PEN 40 MG/0.8ML injection   Generic drug:  adalimumab           sertraline 50 MG tablet    ZOLOFT    90 tablet    Take 1 tablet (50 mg) by mouth daily    Anxiety state

## 2018-07-23 NOTE — PROGRESS NOTES
SUBJECTIVE:   CC: Richard Loza is an 48 year old male who presents for preventative health visit.     Healthy Habits:    Do you get at least three servings of calcium containing foods daily (dairy, green leafy vegetables, etc.)? yes    Amount of exercise or daily activities, outside of work: 3-4 day(s) per week    Problems taking medications regularly No    Medication side effects: No    Have you had an eye exam in the past two years? no    Do you see a dentist twice per year? yes    Do you have sleep apnea, excessive snoring or daytime drowsiness?no     Medication Followup of Adderall    Taking Medication as prescribed: yes    Side Effects:  None    Medication Helping Symptoms:  Yes    Last dosage: this morning 8:30am        Depression and Anxiety Follow-Up    Status since last visit: Stable    Other associated symptoms:None    Complicating factors:     Significant life event: No     Current substance abuse: None    PHQ-9 9/25/2017 10/9/2017 1/12/2018   Total Score 4 0 0   Q9: Suicide Ideation Not at all Not at all Not at all     JANAK-7 SCORE 9/25/2017 10/9/2017 1/12/2018   Total Score - - -   Total Score 5 0 0     In the past two weeks have you had thoughts of suicide or self-harm?  No.    Do you have concerns about your personal safety or the safety of others?   No  PHQ-9  English  PHQ-9   Any Language  JANAK-7  Suicide Assessment Five-step Evaluation and Treatment (SAFE-T)    Today's PHQ-2 Score: 0-0  PHQ-2 ( 1999 Pfizer) 10/9/2017 10/18/2016   Q1: Little interest or pleasure in doing things 0 0   Q2: Feeling down, depressed or hopeless 0 0   PHQ-2 Score 0 0       Abuse: Current or Past(Physical, Sexual or Emotional)- No  Do you feel safe in your environment - Yes    Social History   Substance Use Topics     Smoking status: Never Smoker     Smokeless tobacco: Never Used     Alcohol use 0.0 - 0.5 oz/week     0 - 1 Standard drinks or equivalent per week      Comment: 1-2 drinks per month      If you drink  "alcohol do you typically have >3 drinks per day or >7 drinks per week? No                      Last PSA:   PSA   Date Value Ref Range Status   10/18/2016 0.50 0 - 4 ug/L Final     Comment:     Assay Method:  Chemiluminescence using Siemens Vista analyzer       Reviewed orders with patient. Reviewed health maintenance and updated orders accordingly - Yes  Labs reviewed in EPIC    Reviewed and updated as needed this visit by clinical staff  Tobacco  Allergies  Meds  Med Hx  Surg Hx  Fam Hx  Soc Hx        Reviewed and updated as needed this visit by Provider            ROS:  CONSTITUTIONAL: NEGATIVE for fever, chills, change in weight  INTEGUMENTARY/SKIN: NEGATIVE for worrisome rashes, moles or lesions  EYES: NEGATIVE for vision changes or irritation  ENT: NEGATIVE for ear, mouth and throat problems  RESP: NEGATIVE for significant cough or SOB  CV: NEGATIVE for chest pain, palpitations or peripheral edema  GI: NEGATIVE for nausea, abdominal pain, heartburn, or change in bowel habits   male: negative for dysuria, hematuria, decreased urinary stream, erectile dysfunction, urethral discharge  MUSCULOSKELETAL: NEGATIVE for significant arthralgias or myalgia  NEURO: NEGATIVE for weakness, dizziness or paresthesias  PSYCHIATRIC: NEGATIVE for changes in mood or affect    OBJECTIVE:   /86 (BP Location: Right arm, Patient Position: Chair, Cuff Size: Adult Large)  Pulse 90  Temp 98.4  F (36.9  C) (Oral)  Ht 5' 9\" (1.753 m)  Wt 186 lb (84.4 kg)  SpO2 97%  BMI 27.47 kg/m2  EXAM:  GENERAL: healthy, alert and no distress  EYES: Eyes grossly normal to inspection, PERRL and conjunctivae and sclerae normal  HENT: ear canals and TM's normal, nose and mouth without ulcers or lesions  NECK: no adenopathy, no asymmetry, masses, or scars and thyroid normal to palpation  RESP: lungs clear to auscultation - no rales, rhonchi or wheezes  CV: regular rate and rhythm, normal S1 S2, no S3 or S4, no murmur, click or rub, no " "peripheral edema and peripheral pulses strong  ABDOMEN: soft, nontender, no hepatosplenomegaly, no masses and bowel sounds normal  MS: no gross musculoskeletal defects noted, no edema  SKIN: no suspicious lesions or rashes  NEURO: Normal strength and tone, mentation intact and speech normal  PSYCH: mentation appears normal, affect normal/bright    Diagnostic Test Results:  Labs - pending  ASSESSMENT/PLAN:   1. Routine general medical examination at a health care facility      2. Anxiety state    - Patient does not need refills on the Zoloft.  This Rx will last until January when he is due for his 6 month medication followup.      3. Attention deficit hyperactivity disorder (ADHD), combined type    - amphetamine-dextroamphetamine (ADDERALL) 5 MG per tablet; Take 2 tablets in the morning, then 1 tab around noon, and 1 tab in the afternoon.  Dispense: 120 tablet; Refill: 0    4. Controlled substance agreement signed - 10.17.2017    - amphetamine-dextroamphetamine (ADDERALL) 5 MG per tablet; Take 2 tablets in the morning, then 1 tab around noon, and 1 tab in the afternoon.  Dispense: 120 tablet; Refill: 0    5. CARDIOVASCULAR SCREENING; LDL GOAL LESS THAN 160    - Lipid panel reflex to direct LDL Fasting    6. Family history of diabetes mellitus    - Comprehensive metabolic panel (BMP + Alb, Alk Phos, ALT, AST, Total. Bili, TP)    7. Screening for prostate cancer    - PROSTATE SPEC ANTIGEN SCREEN    8. Screening for HIV (human immunodeficiency virus)    - Patient declined HIV testing today.      9. Screening for deficiency anemia    - CBC with platelets and differential    COUNSELING:  Reviewed preventive health counseling, as reflected in patient instructions    BP Readings from Last 1 Encounters:   07/23/18 124/86     Estimated body mass index is 27.47 kg/(m^2) as calculated from the following:    Height as of this encounter: 5' 9\" (1.753 m).    Weight as of this encounter: 186 lb (84.4 kg).      Weight management " plan: Discussed healthy diet and exercise guidelines and patient will follow up in 12 months in clinic to re-evaluate.     reports that he has never smoked. He has never used smokeless tobacco.      Counseling Resources:  ATP IV Guidelines  Pooled Cohorts Equation Calculator  FRAX Risk Assessment  ICSI Preventive Guidelines  Dietary Guidelines for Americans, 2010  USDA's MyPlate  ASA Prophylaxis  Lung CA Screening    Muna Mcclain PA-C  [facility]

## 2018-07-24 ASSESSMENT — ANXIETY QUESTIONNAIRES: GAD7 TOTAL SCORE: 0

## 2018-07-24 ASSESSMENT — PATIENT HEALTH QUESTIONNAIRE - PHQ9: SUM OF ALL RESPONSES TO PHQ QUESTIONS 1-9: 0

## 2018-07-25 NOTE — PROGRESS NOTES
Jacqui Ramos ,    The results from your recent lab work are within normal limits, but the cholesterol level shows a significantly elevated triglyceride level, please make a follow-up appointment via office visit or telephone visit to discuss treating this with medications.       Thank you for choosing [facility] for your health care needs,      Muna Mcclain PA-C

## 2018-10-08 ENCOUNTER — TRANSFERRED RECORDS (OUTPATIENT)
Dept: HEALTH INFORMATION MANAGEMENT | Facility: CLINIC | Age: 48
End: 2018-10-08

## 2018-10-31 ENCOUNTER — OFFICE VISIT (OUTPATIENT)
Dept: FAMILY MEDICINE | Facility: CLINIC | Age: 48
End: 2018-10-31
Payer: COMMERCIAL

## 2018-10-31 VITALS
HEIGHT: 69 IN | OXYGEN SATURATION: 96 % | HEART RATE: 92 BPM | WEIGHT: 186 LBS | DIASTOLIC BLOOD PRESSURE: 93 MMHG | TEMPERATURE: 98.6 F | SYSTOLIC BLOOD PRESSURE: 129 MMHG | BODY MASS INDEX: 27.55 KG/M2

## 2018-10-31 DIAGNOSIS — F90.2 ATTENTION DEFICIT HYPERACTIVITY DISORDER (ADHD), COMBINED TYPE: Primary | ICD-10-CM

## 2018-10-31 DIAGNOSIS — Z23 NEED FOR PROPHYLACTIC VACCINATION AND INOCULATION AGAINST INFLUENZA: ICD-10-CM

## 2018-10-31 DIAGNOSIS — Z79.899 CONTROLLED SUBSTANCE AGREEMENT SIGNED: ICD-10-CM

## 2018-10-31 DIAGNOSIS — R03.0 ELEVATED BP WITHOUT DIAGNOSIS OF HYPERTENSION: ICD-10-CM

## 2018-10-31 PROCEDURE — 80307 DRUG TEST PRSMV CHEM ANLYZR: CPT | Mod: 90 | Performed by: PHYSICIAN ASSISTANT

## 2018-10-31 PROCEDURE — 90686 IIV4 VACC NO PRSV 0.5 ML IM: CPT | Performed by: PHYSICIAN ASSISTANT

## 2018-10-31 PROCEDURE — 99000 SPECIMEN HANDLING OFFICE-LAB: CPT | Performed by: PHYSICIAN ASSISTANT

## 2018-10-31 PROCEDURE — 90471 IMMUNIZATION ADMIN: CPT | Performed by: PHYSICIAN ASSISTANT

## 2018-10-31 PROCEDURE — 99214 OFFICE O/P EST MOD 30 MIN: CPT | Mod: 25 | Performed by: PHYSICIAN ASSISTANT

## 2018-10-31 RX ORDER — DEXTROAMPHETAMINE SACCHARATE, AMPHETAMINE ASPARTATE, DEXTROAMPHETAMINE SULFATE AND AMPHETAMINE SULFATE 1.25; 1.25; 1.25; 1.25 MG/1; MG/1; MG/1; MG/1
TABLET ORAL
Qty: 120 TABLET | Refills: 0 | Status: SHIPPED | OUTPATIENT
Start: 2018-10-31 | End: 2018-12-14

## 2018-10-31 NOTE — LETTER
[facility] [place]    10/31/18    Patient: Richard Loza  YOB: 1970  Medical Record Number: [id number]                                                                  Controlled Substance Agreement  I understand that my care provider has prescribed controlled substances (narcotics, tranquilizers, and/or stimulants) to help manage my condition(s).  I am taking this medicine to help me function or work.  I know that this is strong medicine.  It could have serious side effects and even cause a dependency on the drug.  If I stop these medicines suddenly, I could have severe withdrawal symptoms.    The risks, benefits, and side effects of these medication(s) were explained to me.  I agree that:  1. I will take part in other treatments as advised by my provider.  This may be psychiatry or counseling, physical therapy, behavioral therapy, group treatment, or a referral to a pain clinic.  I will reduce or stop my medicine when my provider tells me to do so.   2. I will take my medicines as prescribed.  I will not change the dose or schedule unless my provider tells me to.  There will be no refills if I  run out early.   I may be contacted at any time without warning and asked to complete a drug test or pill count.   3. I will keep all my appointments at the clinic.  If I miss appointments or fail to follow instructions, my provider may stop my medicine.  4. I will not ask other providers to prescribe controlled substances. And I will not accept controlled substances from other people. If I need another prescribed controlled substance for a new reason, I will notify my provider within one business day.  5. If I enroll in the [place] Medical Marijuana program, I will tell my provider.  I will also sign an agreement to share my medical records with my provider.  6. I will use one pharmacy to fill all of my controlled substance prescriptions.  If my prescription is mailed to my pharmacy, it may take  5 to 7 days for my medicine to be ready.  7. I understand that my provider, clinic care team, and pharmacy can track controlled substance prescriptions from other providers through a central database (prescription monitoring program).  8. I will bring in my list of medications (or my medicine bottles) each time I come to the clinic.  [id number] REV-  07/2018                                                                                                                                   Page 1 of 2      [facility] [place]    10/31/18    Patient: Richard Loza  YOB: 1970  Medical Record Number: [id number]    9. Refills of controlled substances will be made only during office hours.  It is up to me to make sure that I do not run out of my medicines on weekends or holidays.    10. I am responsible for my prescriptions.  If the medicine/prescription is lost or stolen, it will not be replaced.   I also agree not to share these medicines with anyone.  11. I agree to not use ANY illegal or recreational drugs.  This includes marijuana, cocaine, bath salts or other drugs.  I agree not to use alcohol unless my provider says I may.  I agree to give urine samples whenever asked.  If I fail to give a urine sample, the provider may stop my medicine.     12. I will tell my nurse or provider right away if I become pregnant or have a new medical problem treated outside of [facility].  13. I understand that this medicine can affect my thinking and judgment.  It may be unsafe for me to drive, use machinery and do dangerous tasks.  I will not do any of these things until I know how the medicine affects me.  If my dose changes, I will wait to see how it affects me.  I will contact my provider if I have concerns about medicine side effects.  I understand that if I do not follow any of the conditions above, my prescriptions or treatment may be stopped.    I agree that my provider, clinic care team, and pharmacy may  work with any city, state or federal law enforcement agency that investigates the misuse, sale, or other diversion of my controlled medicine. I will allow my provider to discuss my care with or share a copy of this agreement with any other treating provider, pharmacy or emergency room where I receive care.  I agree to give up (waive) any right of privacy or confidentiality with respect to these authorizations.   I have read this agreement and have asked questions about anything I did not understand.   ___________________________________    ___________________________  Patient Signature                                                           Date and Time  ___________________________________     ____________________________  Witness                                                                            Date and Time  ___________________________________  Muna Mcclain PA-C  [id number] REV-  07/2018                                                                                                                                                   Page 2 of 2

## 2018-10-31 NOTE — PATIENT INSTRUCTIONS
Controlling High Blood Pressure  High blood pressure (hypertension) is often called the silent killer. This is because many people who have it don t know it. High blood pressure can raise your risk of heart attack, stroke, and heart failure. Controlling your blood pressure can decrease your risk of these problems. Know your blood pressure and remember to check it regularly. Doing so can save your life.  Blood pressure measurements are given as 2 numbers. Systolic blood pressure is the upper number. This is the pressure when the heart contracts. Diastolic blood pressure is the lower number. This is the pressure when the heart relaxes between beats.  Blood pressure is categorized as normal, elevated, or stage 1 or stage 2 high blood pressure:    Normal blood pressure is systolic of less than 120 and diastolic of less than 80 (120/80)    Elevated blood pressure is systolic of 120 to 129 and diastolic less than 80    Stage 1 high blood pressure is systolic is 130 to 139 or diastolic between 80 to 89    Stage 2 high blood pressure is when systolic is 140 or higher or the diastolic is 90 or higher  Here are some things you can do to help control your blood pressure.    Choose heart-healthy foods    Select low-salt, low-fat foods. Limit sodium intake to 2,400 mg per day or the amount suggested by your healthcare provider.    Limit canned, dried, cured, packaged, and fast foods. These can contain a lot of salt.    Eat 8 to 10 servings of fruits and vegetables every day.    Choose lean meats, fish, or chicken.    Eat whole-grain pasta, brown rice, and beans.    Eat 2 to 3 servings of low-fat or fat-free dairy products.    Ask your doctor about the DASH eating plan. This plan helps reduce blood pressure.    When you go to a restaurant, ask that your meal be prepared with no added salt.  Maintain a healthy weight    Ask your healthcare provider how many calories to eat a day. Then stick to that number.    Ask your healthcare  provider what weight range is healthiest for you. If you are overweight, a weight loss of only 3% to 5% of your body weight can help lower blood pressure. Generally, a good weight loss goal is to lose 10% of your body weight in a year.    Limit snacks and sweets.    Get regular exercise.  Get up and get active    Choose activities you enjoy. Find ones you can do with friends or family. This includes bicycling, dancing, walking, and jogging.    Park farther away from building entrances.    Use stairs instead of the elevator.    When you can, walk or bike instead of driving.    [place] leaves, garden, or do household repairs.    Be active at a moderate to vigorous level of physical activity for at least 40 minutes for a minimum of 3 to 4 days a week.   Manage stress    Make time to relax and enjoy life. Find time to laugh.    Communicate your concerns with your loved ones and your healthcare provider.    Visit with family and friends, and keep up with hobbies.  Limit alcohol and quit smoking    Men should have no more than 2 drinks per day.    Women should have no more than 1 drink per day.    Talk with your healthcare provider about quitting smoking. Smoking significantly increases your risk for heart disease and stroke. Ask your healthcare provider about community smoking cessation programs and other options.  Medicines  If lifestyle changes aren t enough, your healthcare provider may prescribe high blood pressure medicine. Take all medicines as prescribed. If you have any questions about your medicines, ask your healthcare provider before stopping or changing them.   Date Last Reviewed: 4/27/2016 2000-2017 [employer]. [address] [place] [address], [place] [address]. All rights reserved. This information is not intended as a substitute for professional medical care. Always follow your healthcare professional's instructions.

## 2018-10-31 NOTE — PROGRESS NOTES
"  SUBJECTIVE:                                                    Richard Loza is a 48 year old male who presents to clinic today for the following health issues:    Medication Followup of Adderall 5mg-    Taking Medication as prescribed: yes    Side Effects:  None    Medication Helping Symptoms:  yes    Last dose taken: This morning    Patient is doing well.  He feels well controlled on his medication and does not have concerns of side effects.      Problem list and histories reviewed & adjusted, as indicated.  Additional history: as documented      ROS:  Constitutional, HEENT, cardiovascular, pulmonary, GI, , musculoskeletal, neuro, skin, endocrine and psych systems are negative, except as otherwise noted.    OBJECTIVE:                                                    BP (!) 129/93 (BP Location: Left arm, Patient Position: Chair, Cuff Size: Adult Large)  Pulse 92  Temp 98.6  F (37  C) (Oral)  Ht 5' 9\" (1.753 m)  Wt 186 lb (84.4 kg)  SpO2 96%  BMI 27.47 kg/m2  Body mass index is 27.47 kg/(m^2).  GENERAL: healthy, alert and no distress  EYES: Eyes grossly normal to inspection, PERRL and conjunctivae and sclerae normal  RESP: lungs clear to auscultation - no rales, rhonchi or wheezes  CV: regular rate and rhythm, normal S1 S2, no S3 or S4, no murmur, click or rub, no peripheral edema and peripheral pulses strong  MS: no gross musculoskeletal defects noted, no edema  NEURO: Normal strength and tone, mentation intact and speech normal  PSYCH: mentation appears normal, affect normal/bright    Diagnostic Test Results:  Urine drug screen - pending     ASSESSMENT/PLAN:                                                      Richard was seen today for recheck medication.    Diagnoses and all orders for this visit:    Attention deficit hyperactivity disorder (ADHD), combined type  -     Drug  Screen Comprehensive , Urine with Reported Meds (MedTox) (Pain Care Package)  -     amphetamine-dextroamphetamine (ADDERALL) 5 " MG per tablet; Take 2 tablets in the morning, then 1 tab around noon, and 1 tab in the afternoon.    Controlled substance agreement signed - 10.31.2018  -     Drug  Screen Comprehensive , Urine with Reported Meds (MedTox) (Pain Care Package)  -     amphetamine-dextroamphetamine (ADDERALL) 5 MG per tablet; Take 2 tablets in the morning, then 1 tab around noon, and 1 tab in the afternoon.    Elevated BP without diagnosis of hypertension    Need for prophylactic vaccination and inoculation against influenza  -     FLU VACCINE, SPLIT VIRUS, IM (QUADRIVALENT) [15311]- >3 YRS  -     Vaccine Administration, Initial [24095]      Patient is here for a medication check regarding ADHD.  He is doing well on the medication and denies any issues with side effects.  He feels like symptoms are well controlled.  The BP is mildly elevated today, and therefore this needs to be rechecked within the month.  Patient has been advised to followup within the next 2 weeks for a nurse only BP reading.  Will need to consider a dose adjustment if the BP is still elevated at that time.       Followup: Return in about 2 weeks (around 11/14/2018) for BP Recheck, nurse appointment.    -- I have discussed the patient's diagnosis, and my plan of treatment with the patient and/or family. Patient is aware to followup if symptoms do not improve.  Patient has been advised to be seen sooner or seek more immediate care if symptoms change or worsen.  Patient agrees with and understands the plan today.     See Patient Instructions        Muna Mcclain PA-C    [facility] [place]      Injectable Influenza Immunization Documentation    1.  Is the person to be vaccinated sick today?   No    2. Does the person to be vaccinated have an allergy to a component   of the vaccine?   No  Egg Allergy Algorithm Link    3. Has the person to be vaccinated ever had a serious reaction   to influenza vaccine in the past?   No    4. Has the person to be vaccinated  ever had Guillain-Barré syndrome?   No    Form completed by Pt

## 2018-11-03 LAB — PAIN DRUG SCR UR W RPTD MEDS: NORMAL

## 2018-12-08 ENCOUNTER — ALLIED HEALTH/NURSE VISIT (OUTPATIENT)
Dept: FAMILY MEDICINE | Facility: CLINIC | Age: 48
End: 2018-12-08
Payer: COMMERCIAL

## 2018-12-08 VITALS — DIASTOLIC BLOOD PRESSURE: 82 MMHG | SYSTOLIC BLOOD PRESSURE: 132 MMHG

## 2018-12-08 DIAGNOSIS — Z01.30 BP CHECK: Primary | ICD-10-CM

## 2018-12-08 PROCEDURE — 99207 ZZC NO CHARGE NURSE ONLY: CPT | Performed by: PHYSICIAN ASSISTANT

## 2018-12-08 NOTE — PROGRESS NOTES
Richard Loza is enrolled/participating in the retail pharmacy Blood Pressure Goals Achievement Program (BPGAP).  Richard Loza was evaluated at [facility] on December 8, 2018 at which time his blood pressure was:    BP Readings from Last 3 Encounters:   12/08/18 132/82   10/31/18 (!) 129/93   07/23/18 124/86     Reviewed lifestyle modifications for blood pressure control and reduction: including making healthy food choices, managing weight, getting regular exercise, smoking cessation, reducing alcohol consumption, monitoring blood pressure regularly.     Richard Loza is not experiencing symptoms.    Follow-Up: BP is at goal of < 140/90mmHg (patient 18+ years of age with or without diabetes).  Recommended follow-up at pharmacy in 6 months.     Recommendation to Provider: no change at this time.       This note completed by: Cata Alonzo, PharmD  [facility]  PH: [phone]

## 2018-12-08 NOTE — MR AVS SNAPSHOT
After Visit Summary   12/8/2018    Richard Loza    MRN: [id number]           Patient Information     Date Of Birth          1970        Visit Information        Provider Department      12/8/2018 10:02 AM Muna Mcclain PA-C [facility]        Today's Diagnoses     BP check    -  1       Follow-ups after your visit        Who to contact     If you have questions or need follow up information about today's clinic visit or your schedule please contact [facility] directly at [phone].  Normal or non-critical lab and imaging results will be communicated to you by [email or web address], letter or phone within 4 business days after the clinic has received the results. If you do not hear from us within 7 days, please contact the clinic through "[employer]t or phone. If you have a critical or abnormal lab result, we will notify you by phone as soon as possible.  Submit refill requests through [employer] or call your pharmacy and they will forward the refill request to us. Please allow 3 business days for your refill to be completed.          Additional Information About Your Visit        MyChart Information     [employer] gives you secure access to your electronic health record. If you see a primary care provider, you can also send messages to your care team and make appointments. If you have questions, please call your primary care clinic.  If you do not have a primary care provider, please call [phone] and they will assist you.        Care EveryWhere ID     This is your Care EveryWhere ID. This could be used by other organizations to access your [facility] medical records  [id number]         Blood Pressure from Last 3 Encounters:   12/08/18 132/82   10/31/18 (!) 129/93   07/23/18 124/86    Weight from Last 3 Encounters:   10/31/18 186 lb (84.4 kg)   07/23/18 186 lb (84.4 kg)   06/19/18 186 lb (84.4 kg)              Today, you had the following     No orders found for display        Primary Care Provider Office Phone # Fax #    Muna Mcclain PA-C [phone] [address] [address]        Equal Access to Services     AMOL GARZON : Hadgary tor paz levaro Sochantalali, waaxda luqadaha, qaybta kaalmada adeegyada, viky gallego ranjeetmariana alston laDaleshreyas clifton. So [facility] [phone].    ATENCIÓN: Si habla español, tiene a qureshi disposición servicios gratuitos de asistencia lingüística. Ll[facility] [phone].    We comply with applicable federal civil rights laws and [place] laws. We do not discriminate on the basis of race, color, national origin, age, disability, sex, sexual orientation, or gender identity.            Thank you!     Thank you for choosing [facility]  for your care. Our goal is always to provide you with excellent care. Hearing back from our patients is one way we can continue to improve our services. Please take a few minutes to complete the written survey that you may receive in the mail after your visit with us. Thank you!             Your Updated Medication List - Protect others around you: Learn how to safely use, store and throw away your medicines at www.disposemymeds.org.          This list is accurate as of 12/8/18 10:04 AM.  Always use your most recent med list.                   Brand Name Dispense Instructions for use Diagnosis    amphetamine-dextroamphetamine 5 MG tablet    ADDERALL    120 tablet    Take 2 tablets in the morning, then 1 tab around noon, and 1 tab in the afternoon.    Attention deficit hyperactivity disorder (ADHD), combined type, Controlled substance agreement signed       colestipol 1 g tablet    COLESTID    90 tablet    Take 1 tablet (1 g) by mouth daily    Crohn's disease of both small and large intestine without complication (H)       HUMIRA PEN 40 MG/0.8ML injection   Generic drug:  adalimumab           sertraline 50 MG tablet    ZOLOFT    90 tablet    Take 1 tablet (50 mg) by mouth daily     Anxiety state

## 2018-12-11 DIAGNOSIS — Z79.899 CONTROLLED SUBSTANCE AGREEMENT SIGNED: ICD-10-CM

## 2018-12-11 DIAGNOSIS — F90.2 ATTENTION DEFICIT HYPERACTIVITY DISORDER (ADHD), COMBINED TYPE: ICD-10-CM

## 2018-12-11 NOTE — TELEPHONE ENCOUNTER
Reason for Call:  Medication or medication refill:    Do you use a [facility]?  Name of the pharmacy and phone number for the current request:   WRITTEN PRESCRIPTION REQUESTED     Name of the medication requested: amphetamine-dextroamphetamine (ADDERALL) 5 MG per tablet    Can we leave a detailed message on this number? YES    Phone number patient can be reached at: Cell number on file:    Telephone Information:   Mobile [phone]     Best Time: Anytime    Call taken on 12/11/2018 at 1:25 PM by Jocelyn Melton

## 2018-12-11 NOTE — TELEPHONE ENCOUNTER
Controlled Substance Refill Request for amphetamine-dextroamphetamine (ADDERALL) 5 MG per tablet  Problem List Complete:  Yes    Last Written Prescription Date:  10.31.18  Last Fill Quantity: 120,   # refills: 0    Last Office Visit with [facility] primary care provider: 10.31.18    Clinic visit frequency required: -     Future Office visit:     Controlled substance agreement on file: Yes:  Date 10.17.17.     Processing:  Fax Rx to listed pharmacy   checked in past 3 months?  Yes 11.1.18

## 2018-12-12 NOTE — TELEPHONE ENCOUNTER
Routing refill request to provider for review/approval because:  Drug not on the [facility] refill protocol   Amy Sinclair RN  [facility]

## 2018-12-14 RX ORDER — DEXTROAMPHETAMINE SACCHARATE, AMPHETAMINE ASPARTATE, DEXTROAMPHETAMINE SULFATE AND AMPHETAMINE SULFATE 1.25; 1.25; 1.25; 1.25 MG/1; MG/1; MG/1; MG/1
TABLET ORAL
Qty: 120 TABLET | Refills: 0 | Status: SHIPPED | OUTPATIENT
Start: 2018-12-14 | End: 2018-12-22

## 2018-12-14 NOTE — TELEPHONE ENCOUNTER
PT called in to inquire about the status of his refill request. He stated he has enough to get through the weekend, but was upset that he requested it on Monday and it still hasn't been done.     PH: [phone]  Ok to leave message    Myah Burns  Patient Representative - [facility]

## 2018-12-17 NOTE — TELEPHONE ENCOUNTER
rx picked up by patient on 12/18/18.  Rae Jimenez          Brought rx to .  Patient notified via message.    Rae Jimenez

## 2018-12-18 NOTE — TELEPHONE ENCOUNTER
Pt would like to know if he can get another 2 months of his rx post dated. Please walk over to the pharm [facility] [address]. [phone] ok to leave a message.    Amy Nugent  Patient

## 2018-12-22 RX ORDER — DEXTROAMPHETAMINE SACCHARATE, AMPHETAMINE ASPARTATE, DEXTROAMPHETAMINE SULFATE AND AMPHETAMINE SULFATE 1.25; 1.25; 1.25; 1.25 MG/1; MG/1; MG/1; MG/1
TABLET ORAL
Qty: 120 TABLET | Refills: 0 | Status: SHIPPED | OUTPATIENT
Start: 2019-02-14 | End: 2019-03-15

## 2018-12-22 RX ORDER — DEXTROAMPHETAMINE SACCHARATE, AMPHETAMINE ASPARTATE, DEXTROAMPHETAMINE SULFATE AND AMPHETAMINE SULFATE 1.25; 1.25; 1.25; 1.25 MG/1; MG/1; MG/1; MG/1
TABLET ORAL
Qty: 120 TABLET | Refills: 0 | Status: SHIPPED | OUTPATIENT
Start: 2019-01-14 | End: 2018-12-22

## 2018-12-22 NOTE — TELEPHONE ENCOUNTER
Next two months of Rx's done and placed in fior's bin.  She can contact patient to pick them up.

## 2018-12-24 NOTE — TELEPHONE ENCOUNTER
Walked Rx to [facility].  Left message notifying patient.    Rae Jimenez

## 2019-03-06 ENCOUNTER — TRANSFERRED RECORDS (OUTPATIENT)
Dept: HEALTH INFORMATION MANAGEMENT | Facility: CLINIC | Age: 49
End: 2019-03-06

## 2019-03-14 DIAGNOSIS — F41.1 ANXIETY STATE: ICD-10-CM

## 2019-03-15 ENCOUNTER — OFFICE VISIT (OUTPATIENT)
Dept: FAMILY MEDICINE | Facility: CLINIC | Age: 49
End: 2019-03-15
Payer: COMMERCIAL

## 2019-03-15 VITALS
HEIGHT: 69 IN | DIASTOLIC BLOOD PRESSURE: 74 MMHG | OXYGEN SATURATION: 96 % | SYSTOLIC BLOOD PRESSURE: 102 MMHG | HEART RATE: 92 BPM | BODY MASS INDEX: 27.4 KG/M2 | TEMPERATURE: 98.4 F | WEIGHT: 185 LBS

## 2019-03-15 DIAGNOSIS — F90.2 ATTENTION DEFICIT HYPERACTIVITY DISORDER (ADHD), COMBINED TYPE: ICD-10-CM

## 2019-03-15 DIAGNOSIS — F41.1 ANXIETY STATE: ICD-10-CM

## 2019-03-15 DIAGNOSIS — Z79.899 CONTROLLED SUBSTANCE AGREEMENT SIGNED: ICD-10-CM

## 2019-03-15 PROCEDURE — 99214 OFFICE O/P EST MOD 30 MIN: CPT | Performed by: PHYSICIAN ASSISTANT

## 2019-03-15 RX ORDER — DEXTROAMPHETAMINE SACCHARATE, AMPHETAMINE ASPARTATE, DEXTROAMPHETAMINE SULFATE AND AMPHETAMINE SULFATE 1.25; 1.25; 1.25; 1.25 MG/1; MG/1; MG/1; MG/1
TABLET ORAL
Qty: 120 TABLET | Refills: 0 | Status: SHIPPED | OUTPATIENT
Start: 2019-04-14 | End: 2019-03-15

## 2019-03-15 RX ORDER — DEXTROAMPHETAMINE SACCHARATE, AMPHETAMINE ASPARTATE, DEXTROAMPHETAMINE SULFATE AND AMPHETAMINE SULFATE 1.25; 1.25; 1.25; 1.25 MG/1; MG/1; MG/1; MG/1
TABLET ORAL
Qty: 120 TABLET | Refills: 0 | Status: SHIPPED | OUTPATIENT
Start: 2019-05-14 | End: 2019-06-27

## 2019-03-15 RX ORDER — DEXTROAMPHETAMINE SACCHARATE, AMPHETAMINE ASPARTATE, DEXTROAMPHETAMINE SULFATE AND AMPHETAMINE SULFATE 1.25; 1.25; 1.25; 1.25 MG/1; MG/1; MG/1; MG/1
TABLET ORAL
Qty: 120 TABLET | Refills: 0 | Status: SHIPPED | OUTPATIENT
Start: 2019-03-15 | End: 2019-03-15

## 2019-03-15 ASSESSMENT — ANXIETY QUESTIONNAIRES
6. BECOMING EASILY ANNOYED OR IRRITABLE: NOT AT ALL
GAD7 TOTAL SCORE: 0
1. FEELING NERVOUS, ANXIOUS, OR ON EDGE: NOT AT ALL
3. WORRYING TOO MUCH ABOUT DIFFERENT THINGS: NOT AT ALL
5. BEING SO RESTLESS THAT IT IS HARD TO SIT STILL: NOT AT ALL
7. FEELING AFRAID AS IF SOMETHING AWFUL MIGHT HAPPEN: NOT AT ALL
2. NOT BEING ABLE TO STOP OR CONTROL WORRYING: NOT AT ALL

## 2019-03-15 ASSESSMENT — PATIENT HEALTH QUESTIONNAIRE - PHQ9
5. POOR APPETITE OR OVEREATING: NOT AT ALL
SUM OF ALL RESPONSES TO PHQ QUESTIONS 1-9: 0

## 2019-03-15 ASSESSMENT — MIFFLIN-ST. JEOR: SCORE: 1699.53

## 2019-03-15 NOTE — PROGRESS NOTES
"  SUBJECTIVE:                                                    Richard Loza is a 49 year old male who presents to clinic today for the following health issues:      Medication Followup of Adderall    Taking Medication as prescribed: yes    Side Effects:  None    Medication Helping Symptoms:  yes    Last Dose Taken - This morning at 8am      Depression and Anxiety Follow-Up    Status since last visit: Stable - doing great     Other associated symptoms:None    Complicating factors:     Significant life event: No     Current substance abuse: None    PHQ 1/12/2018 7/23/2018 3/15/2019   PHQ-9 Total Score 0 0 0   Q9: Suicide Ideation Not at all Not at all Not at all     JANAK-7 SCORE 1/12/2018 7/23/2018 3/15/2019   Total Score - - -   Total Score 0 0 0     In the past two weeks have you had thoughts of suicide or self-harm?  No.    Do you have concerns about your personal safety or the safety of others?   No  PHQ-9  English  PHQ-9   Any Language  JANAK-7  Suicide Assessment Five-step Evaluation and Treatment (SAFE-T)    Amount of exercise or physical activity: 3 days/week for an average of 60 minutes    Problems taking medications regularly: No    Medication side effects: none    Diet: regular (no restrictions)    Patient is doing well and does not have any concerns today.  He feels that the doses are appropriate and he is well controlled.      Problem list and histories reviewed & adjusted, as indicated.  Additional history: as documented      ROS:  Constitutional, HEENT, cardiovascular, pulmonary, GI, , musculoskeletal, neuro, skin, endocrine and psych systems are negative, except as otherwise noted.    OBJECTIVE:                                                    /74 (BP Location: Left arm, Patient Position: Chair, Cuff Size: Adult Large)   Pulse 92   Temp 98.4  F (36.9  C) (Oral)   Ht 1.753 m (5' 9\")   Wt 83.9 kg (185 lb)   SpO2 96%   BMI 27.32 kg/m    Body mass index is 27.32 kg/m .  GENERAL: healthy, " alert and no distress  EYES: Eyes grossly normal to inspection, PERRL and conjunctivae and sclerae normal  RESP: lungs clear to auscultation - no rales, rhonchi or wheezes  CV: regular rate and rhythm, normal S1 S2, no S3 or S4, no murmur, click or rub, no peripheral edema and peripheral pulses strong  MS: no gross musculoskeletal defects noted, no edema  NEURO: Normal strength and tone, mentation intact and speech normal  PSYCH: mentation appears normal, affect normal/bright    Diagnostic Test Results:  none      ASSESSMENT/PLAN:                                                      Richard was seen today for recheck medication.    Diagnoses and all orders for this visit:    Anxiety state  -     sertraline (ZOLOFT) 50 MG tablet; Take 1 tablet (50 mg) by mouth daily    Attention deficit hyperactivity disorder (ADHD), combined type  -     Discontinue: amphetamine-dextroamphetamine (ADDERALL) 5 MG tablet; Take 2 tablets in the morning, then 1 tab around noon, and 1 tab in the afternoon.  -     Discontinue: amphetamine-dextroamphetamine (ADDERALL) 5 MG tablet; Take 2 tablets in the morning, then 1 tab around noon, and 1 tab in the afternoon.  -     amphetamine-dextroamphetamine (ADDERALL) 5 MG tablet; Take 2 tablets in the morning, then 1 tab around noon, and 1 tab in the afternoon.    Controlled substance agreement signed - 10.31.2018  -     Discontinue: amphetamine-dextroamphetamine (ADDERALL) 5 MG tablet; Take 2 tablets in the morning, then 1 tab around noon, and 1 tab in the afternoon.  -     Discontinue: amphetamine-dextroamphetamine (ADDERALL) 5 MG tablet; Take 2 tablets in the morning, then 1 tab around noon, and 1 tab in the afternoon.  -     amphetamine-dextroamphetamine (ADDERALL) 5 MG tablet; Take 2 tablets in the morning, then 1 tab around noon, and 1 tab in the afternoon.      Patient is doing well.  Will continue current medication regimen.  Refills have been done.  He is due for a medication recheck in  about 6 months, sooner if needed.     Followup: Return in about 6 months (around 9/15/2019) for medication re-check, please be seen sooner if needed.    -- I have discussed the patient's diagnosis, and my plan of treatment with the patient and/or family. Patient is aware to followup if symptoms do not improve.  Patient has been advised to be seen sooner or seek more immediate care if symptoms change or worsen.  Patient agrees with and understands the plan today.     See Patient Instructions        Muna Mcclain PA-C    [facility] [place]

## 2019-03-15 NOTE — TELEPHONE ENCOUNTER
Due for an Office visit for further refills, only fill for 30 days     Georgina Lovell RN, BSN  [facility]

## 2019-03-15 NOTE — TELEPHONE ENCOUNTER
"Requested Prescriptions   Pending Prescriptions Disp Refills     sertraline (ZOLOFT) 50 MG tablet [Pharmacy Med Name: SERTRALINE HCL TABS 50MG] 90 tablet 3      Last Written Prescription Date:  4.3.18  Last Fill Quantity: 90 tablet,  # refills: 3   Last office visit: 10/31/2018 with prescribing provider:  DEON Winston       Future Office Visit:   Next 5 appointments (look out 90 days)    Mar 15, 2019 10:40 AM CDT  Office Visit with Muna Mcclain PA-C  [facility] [address] [place] [id number]  [phone]            Sig: TAKE 1 TABLET DAILY    SSRIs Protocol Passed - 3/14/2019 11:58 PM    PHQ-9 SCORE 10/9/2017 1/12/2018 7/23/2018   PHQ-9 Total Score - - -   PHQ-9 Total Score 0 0 0     JANAK-7 SCORE 10/9/2017 1/12/2018 7/23/2018   Total Score - - -   Total Score 0 0 0                Passed - Recent (12 mo) or future (30 days) visit within the authorizing provider's specialty    Patient had office visit in the last 12 months or has a visit in the next 30 days with authorizing provider or within the authorizing provider's specialty.  See \"Patient Info\" tab in inbasket, or \"Choose Columns\" in Meds & Orders section of the refill encounter.             Passed - Medication is active on med list       Passed - Patient is age 18 or older        "

## 2019-03-16 ASSESSMENT — ANXIETY QUESTIONNAIRES: GAD7 TOTAL SCORE: 0

## 2019-04-19 ENCOUNTER — OFFICE VISIT (OUTPATIENT)
Dept: FAMILY MEDICINE | Facility: CLINIC | Age: 49
End: 2019-04-19
Payer: COMMERCIAL

## 2019-04-19 VITALS
BODY MASS INDEX: 26.66 KG/M2 | SYSTOLIC BLOOD PRESSURE: 120 MMHG | DIASTOLIC BLOOD PRESSURE: 74 MMHG | HEIGHT: 69 IN | TEMPERATURE: 98 F | OXYGEN SATURATION: 98 % | HEART RATE: 100 BPM | WEIGHT: 180 LBS

## 2019-04-19 DIAGNOSIS — J03.90 TONSILLITIS: Primary | ICD-10-CM

## 2019-04-19 DIAGNOSIS — K50.80 CROHN'S DISEASE OF BOTH SMALL AND LARGE INTESTINE WITHOUT COMPLICATION (H): ICD-10-CM

## 2019-04-19 DIAGNOSIS — J02.9 SORE THROAT: ICD-10-CM

## 2019-04-19 DIAGNOSIS — D84.9 IMMUNOSUPPRESSED STATUS (H): ICD-10-CM

## 2019-04-19 LAB
DEPRECATED S PYO AG THROAT QL EIA: NORMAL
SPECIMEN SOURCE: NORMAL

## 2019-04-19 PROCEDURE — 99214 OFFICE O/P EST MOD 30 MIN: CPT | Performed by: FAMILY MEDICINE

## 2019-04-19 PROCEDURE — 87880 STREP A ASSAY W/OPTIC: CPT | Performed by: FAMILY MEDICINE

## 2019-04-19 PROCEDURE — 87081 CULTURE SCREEN ONLY: CPT | Performed by: FAMILY MEDICINE

## 2019-04-19 RX ORDER — AMOXICILLIN 500 MG/1
1000 CAPSULE ORAL 2 TIMES DAILY
Qty: 40 CAPSULE | Refills: 0 | Status: SHIPPED | OUTPATIENT
Start: 2019-04-19 | End: 2019-06-27

## 2019-04-19 ASSESSMENT — MIFFLIN-ST. JEOR: SCORE: 1676.85

## 2019-04-19 NOTE — PROGRESS NOTES
SUBJECTIVE:                                                    Richard Loza is a 49 year old male who presents to clinic today for the following health issues:    Acute Illness   Acute illness concerns: Sore throat  Onset: x 1 week    Fever: no    Chills/Sweats: YES    Headache (location?): YES    Sinus Pressure:YES    Conjunctivitis:  no    Ear Pain: no    Rhinorrhea: YES    Congestion: YES    Sore Throat: YES     Cough: YES-productive of yellow sputum- all throughout the day ; not keeping up at night.     Wheeze: no    Decreased Appetite: YES    Nausea: no    Vomiting: no    Diarrhea:  no    Dysuria/Freq.: no    Fatigue/Achiness: YES    Sick/Strep Exposure: no      Therapies Tried and outcome: Recent Humira injection = 1 week ago.       Problem list and histories reviewed & adjusted, as indicated.  Additional history: as documented    Reviewed and updated as needed this visit by clinical staff  Tobacco  Allergies  Meds  Med Hx  Surg Hx  Fam Hx  Soc Hx      Reviewed and updated as needed this visit by Provider        Patient Active Problem List   Diagnosis     Family history of diabetes mellitus     Anxiety state     Crohns disease (H)     CARDIOVASCULAR SCREENING; LDL GOAL LESS THAN 160     Seasonal allergies     Attention deficit hyperactivity disorder (ADHD), combined type     Controlled substance agreement signed - 10.17.2017       Current Outpatient Medications   Medication Sig Dispense Refill     [START ON 5/14/2019] amphetamine-dextroamphetamine (ADDERALL) 5 MG tablet Take 2 tablets in the morning, then 1 tab around noon, and 1 tab in the afternoon. 120 tablet 0     colestipol (COLESTID) 1 G tablet Take 1 tablet (1 g) by mouth daily 90 tablet 3     HUMIRA PEN 40 MG/0.8ML SC KIT   0     sertraline (ZOLOFT) 50 MG tablet TAKE 1 TABLET DAILY 90 tablet 0     sertraline (ZOLOFT) 50 MG tablet Take 1 tablet (50 mg) by mouth daily 90 tablet 3          Allergies   Allergen Reactions     Nadira Burks  "to move            ROS:   ROS: 12 point ROS neg other than the symptoms noted above    OBJECTIVE:                                                    /74   Pulse 100   Temp 98  F (36.7  C) (Tympanic)   Ht 1.753 m (5' 9\")   Wt 81.6 kg (180 lb)   SpO2 98%   BMI 26.58 kg/m    Body mass index is 26.58 kg/m .   GENERAL: healthy, alert, well nourished, well hydrated, no distress  HENT: ear canals- normal; TMs- normal; Nose- congested, sinuses = nontender; Mouth- no ulcers, no lesions, but 1+ red tonsills are noted.   NECK: no tenderness, no adenopathy, no asymmetry, no masses, no stiffness; thyroid- normal to palpation  RESP: lungs clear to auscultation - no rales, no rhonchi, no wheezes  CV: regular rates and rhythm, normal S1 S2, no S3 or S4 and no murmur, no click or rub   ABDOMEN: soft, no tenderness, no  hepatosplenomegaly, no masses, normal bowel sounds  MS: extremities- no gross deformities noted, no edema.     Diagnostic test results:  Results for orders placed or performed in visit on 04/19/19 (from the past 24 hour(s))   Strep, Rapid Screen   Result Value Ref Range    Specimen Description Throat     Rapid Strep A Screen       NEGATIVE: No Group A streptococcal antigen detected by immunoassay, await culture report.        ASSESSMENT/PLAN:                                                        ICD-10-CM    1. Tonsillitis J03.90 amoxicillin (AMOXIL) 500 MG capsule   2. Sore throat J02.9 Strep, Rapid Screen     Beta strep group A culture   3. Crohn's disease of both small and large intestine without complication (H) K50.80    4. Immunosuppressed status (H)- secondary to Humira injections every other week  D89.9      Please, call or return to clinic or go to the ER immediately if signs or symptoms worsen or fail to improve as anticipated.   See Patient Instructions         Kirstie Mendez MD    [facility]  "

## 2019-04-19 NOTE — PATIENT INSTRUCTIONS
Patient Education     Peritonsillar Infection (Strep Throat)    You (or your child) has an infection around the tonsils. This is generally caused by the streptococcus bacteria, so it is often called strep throat. The infection can cause severe sore throat, pain with swallowing, swollen glands, and fever.  The infection is treated with antibiotics.   Home care    All of the antibiotics should be taken as prescribed until they are gone. This is true even if symptoms start to get better. This is very important to ensure that the infection goes away. This helps prevent serious complications. It also helps keep the infection from spreading to other people.    Pain medicines should be taken as directed. (Do not give aspirin or aspirin-containing medicines to children younger than 18 years. It can cause a serious problem called Reye syndrome.)    To help ease pain, children older than 6 years and adults can gargle with warm salt water. This can be done 4 times a day for the first 2 days. Dissolve 1/2 teaspoon of salt in 1 glass of hot water. Gargle with the solution, then spit it out. (Ensure that children do not swallow the salt water.)    Cool liquids and soft foods may make eating easier for the first few days.  Follow-up care  Follow up with a healthcare provider or as advised within 1-2 days.   When to seek medical advice  Call the healthcare provider right away if any of the following occur:    Fever of 100.4 F (38 C) or higher after 3 days of treatment    Symptoms that get worse or new symptoms     Symptoms that go away and come back    Trouble swallowing    Inability to eat or drink, or refusing food and drink    Trouble breathing    Excessive drooling    Trouble opening the mouth    Neck stiffness    Bleeding    Rash    Swelling or bumps in the neck  Prevention  Here are steps you can take to help prevent an infection:    Keep good hand washing habits.    Don t have close contact with people who have sore  throats, colds, or other upper respiratory infections.    Don t smoke, and stay away from secondhand smoke.    Stay up-to-date with of your vaccines.  Date Last Reviewed: 11/1/2017 2000-2018 [employer]. [address] [place] [address], [place] [address]. All rights reserved. This information is not intended as a substitute for professional medical care. Always follow your healthcare professional's instructions.

## 2019-04-20 LAB
BACTERIA SPEC CULT: NORMAL
SPECIMEN SOURCE: NORMAL

## 2019-06-27 ENCOUNTER — OFFICE VISIT (OUTPATIENT)
Dept: FAMILY MEDICINE | Facility: CLINIC | Age: 49
End: 2019-06-27
Payer: COMMERCIAL

## 2019-06-27 VITALS
DIASTOLIC BLOOD PRESSURE: 80 MMHG | WEIGHT: 189 LBS | BODY MASS INDEX: 27.99 KG/M2 | HEIGHT: 69 IN | OXYGEN SATURATION: 96 % | HEART RATE: 82 BPM | TEMPERATURE: 97.6 F | SYSTOLIC BLOOD PRESSURE: 118 MMHG

## 2019-06-27 DIAGNOSIS — F41.1 ANXIETY STATE: ICD-10-CM

## 2019-06-27 DIAGNOSIS — F90.2 ATTENTION DEFICIT HYPERACTIVITY DISORDER (ADHD), COMBINED TYPE: Primary | ICD-10-CM

## 2019-06-27 DIAGNOSIS — Z79.899 CONTROLLED SUBSTANCE AGREEMENT SIGNED: ICD-10-CM

## 2019-06-27 DIAGNOSIS — E78.2 ELEVATED TRIGLYCERIDES WITH HIGH CHOLESTEROL: ICD-10-CM

## 2019-06-27 PROCEDURE — 99214 OFFICE O/P EST MOD 30 MIN: CPT | Performed by: PHYSICIAN ASSISTANT

## 2019-06-27 RX ORDER — OMEGA-3-ACID ETHYL ESTERS 1 G/1
2 CAPSULE, LIQUID FILLED ORAL 2 TIMES DAILY
Qty: 360 CAPSULE | Refills: 1 | Status: SHIPPED | OUTPATIENT
Start: 2019-06-27 | End: 2019-11-06

## 2019-06-27 RX ORDER — DEXTROAMPHETAMINE SACCHARATE, AMPHETAMINE ASPARTATE, DEXTROAMPHETAMINE SULFATE AND AMPHETAMINE SULFATE 1.25; 1.25; 1.25; 1.25 MG/1; MG/1; MG/1; MG/1
TABLET ORAL
Qty: 120 TABLET | Refills: 0 | Status: SHIPPED | OUTPATIENT
Start: 2019-08-26 | End: 2019-11-06

## 2019-06-27 RX ORDER — DEXTROAMPHETAMINE SACCHARATE, AMPHETAMINE ASPARTATE, DEXTROAMPHETAMINE SULFATE AND AMPHETAMINE SULFATE 1.25; 1.25; 1.25; 1.25 MG/1; MG/1; MG/1; MG/1
TABLET ORAL
Qty: 120 TABLET | Refills: 0 | Status: SHIPPED | OUTPATIENT
Start: 2019-06-27 | End: 2019-11-06

## 2019-06-27 RX ORDER — DEXTROAMPHETAMINE SACCHARATE, AMPHETAMINE ASPARTATE, DEXTROAMPHETAMINE SULFATE AND AMPHETAMINE SULFATE 1.25; 1.25; 1.25; 1.25 MG/1; MG/1; MG/1; MG/1
TABLET ORAL
Qty: 120 TABLET | Refills: 0 | Status: SHIPPED | OUTPATIENT
Start: 2019-07-27 | End: 2019-11-06

## 2019-06-27 ASSESSMENT — ANXIETY QUESTIONNAIRES
5. BEING SO RESTLESS THAT IT IS HARD TO SIT STILL: NOT AT ALL
3. WORRYING TOO MUCH ABOUT DIFFERENT THINGS: NOT AT ALL
1. FEELING NERVOUS, ANXIOUS, OR ON EDGE: NOT AT ALL
IF YOU CHECKED OFF ANY PROBLEMS ON THIS QUESTIONNAIRE, HOW DIFFICULT HAVE THESE PROBLEMS MADE IT FOR YOU TO DO YOUR WORK, TAKE CARE OF THINGS AT HOME, OR GET ALONG WITH OTHER PEOPLE: NOT DIFFICULT AT ALL
6. BECOMING EASILY ANNOYED OR IRRITABLE: NOT AT ALL
7. FEELING AFRAID AS IF SOMETHING AWFUL MIGHT HAPPEN: NOT AT ALL
GAD7 TOTAL SCORE: 0
2. NOT BEING ABLE TO STOP OR CONTROL WORRYING: NOT AT ALL

## 2019-06-27 ASSESSMENT — PATIENT HEALTH QUESTIONNAIRE - PHQ9
5. POOR APPETITE OR OVEREATING: NOT AT ALL
SUM OF ALL RESPONSES TO PHQ QUESTIONS 1-9: 0

## 2019-06-27 ASSESSMENT — MIFFLIN-ST. JEOR: SCORE: 1717.68

## 2019-06-27 NOTE — LETTER
[facility] [place]  06/27/19    Patient: Richard Loza  YOB: 1970  Medical Record Number: [id number]  CSN: [id number]                                                                              Non-opioid Controlled Substance Agreement    I understand that my care provider has prescribed a controlled substance to help manage my condition(s). I am taking this medicine to help me function or work. I know this is strong medicine, and that it can cause serious side effects. Controlled substances can be sedating, addicting and may cause a dependency on the drug. They can affect my ability to drive or think, and cause depression. They need to be taken exactly as prescribed. Combining controlled substances with certain medicines or chemicals (such as cocaine, sedatives and tranquilizers, sleeping pills, meth) can be dangerous or even fatal. Also, if I stop controlled substances suddenly, I may have severe withdrawal symptoms.  If not helpful, I may be asked to stop them.    The risks, benefits, and side effects of these medicine(s) were explained to me. I agree that:    1. I will take part in other treatments as advised by my care team. This may be psychiatry or counseling, physical therapy, behavioral therapy, group treatment or a referral to a pain clinic. I will reduce or stop my medicine when my care team tells me to do so.  2. I will take my medicines as prescribed. I will not change the dose or schedule unless my care team tells me to. There will be no refills if I  run out early.   I may be contactedwithout warning and asked to complete a urine drug test or pill count at any time.   3. I will keep all my appointments, and understand this is part of the monitoring of controlled substances. My care team may require an office visit for EVERY controlled substance refill. If I miss appointments or don t follow instructions, my care team may stop my medicine.  4. I will not ask other providers to  prescribe controlled substances, and I will not accept controlled substances from other people. If I need another prescribed controlled substance for a new reason, I will tell my care team within 1 business day.  5. I will use one pharmacy to fill all of my controlled substance prescriptions, and it is up to me to make sure that I do not run out of my medicines on weekends or holidays. If my care team is willing to refill my controlled substance prescription without a visit, I must request refills only during office hours, refills may take up to 3 days to process, and it may take up to 5 to 7 days for my medicine to be mailed and ready at my pharmacy. Prescriptions will not be mailed anywhere except my pharmacy.    6. I am responsible for my prescriptions. If the medicine/prescription is lost or stolen, it will not be replaced. I also agree not to share controlled substance medicines with anyone.              [facility] [place]  06/27/19  Patient:  Richard Loza  YOB: 1970  Medical Record Number: [id number]  CSN: [id number]    7. I agree to not use ANY illegal or recreational drugs. This includes marijuana, cocaine, bath salts or other drugs. I agree not to use alcohol unless my care team says I may. I agree to give urine samples whenever asked. If I don t give a urine sample, the care team may stop my medicine.    8. If I enroll in the [place] Medical Marijuana program, I will tell my care team. I will also sign an agreement to share my medical records with my care team.    9. I will bring in my list of medicines (or my medicine bottles) each time I come to the clinic.   10. I will tell my care team right away if I become pregnant or have a new medical problem treated outside of my regular clinic.  11. I understand that this medicine can affect my thinking and judgment. It may be unsafe for me to drive, use machinery and do dangerous tasks. I will not do any of these things until I know how  the medicine affects me. If my dose changes, I will wait to see how it affects me. I will contact my care team if I have concerns about medicine side effects.    I understand that if I do not follow any of the conditions above, my prescriptions or treatment may be stopped.      I agree that my provider, clinic care team, and pharmacy may work with any city, state or federal law enforcement agency that investigates the misuse, sale, or other diversion of my controlled medicine. I will allow my provider to discuss my care with or share a copy of this agreement with any other treating provider, pharmacy or emergency room where I receive care. I agree to give up (waive) any right of privacy or confidentiality with respect to these consents.   I have read this agreement and have asked questions about anything I did not understand.    ____________________________________________________    ____________  ________  Patient signature                                                         Date      Time    ____________________________________________________     ____________  ________  Witness                                                          Date      Time    ____________________________________________________  Provider signature

## 2019-06-27 NOTE — Clinical Note
MTM- Regarding Richard's elevated triglycerides, he has a normal reading in 2007 prior to starting Humira.  Is this known for causing this significant of triglycerides?  If so, do we treat or recommend that GI change his DMT?Thanks,Christine Payne, MS, PA-[facility] [place]

## 2019-06-27 NOTE — PROGRESS NOTES
Subjective     Richard Loza is a 49 year old male who presents to clinic today for the following health issues:    HPI   Anxiety Follow-Up  Doing well with sertraline 50 mg.      How are you doing with your anxiety since your last visit? No change    Are you having other symptoms that might be associated with anxiety? No    Have you had a significant life event? No     Are you feeling depressed? No    Do you have any concerns with your use of alcohol or other drugs? No    Medication Followup of  amphetamine-dextroamphetamine (ADDERALL) 5 MG tablet  Takes most days when working.  Works well for focus/distractability.      Taking Medication as prescribed: yes    Side Effects:  None    Medication Helping Symptoms:  yes  Social History     Tobacco Use     Smoking status: Never Smoker     Smokeless tobacco: Never Used   Substance Use Topics     Alcohol use: Yes     Alcohol/week: 0.0 - 0.5 oz     Comment: 1-2 drinks per month     Drug use: No     JANAK-7 SCORE 7/23/2018 3/15/2019 6/27/2019   Total Score - - -   Total Score 0 0 0     PHQ 7/23/2018 3/15/2019 6/27/2019   PHQ-9 Total Score 0 0 0   Q9: Thoughts of better off dead/self-harm past 2 weeks Not at all Not at all Not at all       Amount of exercise or physical activity: 3-4 days/week for an average of 45-60 minutes    Problems taking medications regularly: No    Medication side effects: none    Diet: regular (no restrictions)    Hyperlipidemia   Patient was unaware of these results.  Had normal triglycerides in 2007.  Started Humira ~10 years ago.  Denies significant ETOH use.  Reports healthy/balanced diet.  Unsure of strong family hx of hyperlipidemia.    Recent Labs   Lab Test 07/23/18  1009 10/18/16  0912  10/19/11  1140   CHOL 161 144  --  124   HDL 35* 29*  --  29*   LDL Cannot estimate LDL when triglyceride exceeds 400 mg/dL  85 Cannot estimate LDL when triglyceride exceeds 400 mg/dL  66   < > 27   TRIG 602* 533*  --  340*   CHOLHDLRATIO  --   --   --  4.3     < > = values in this interval not displayed.         Patient Active Problem List   Diagnosis     Family history of diabetes mellitus     Anxiety state     Crohns disease (H)     Seasonal allergies     Attention deficit hyperactivity disorder (ADHD), combined type     Immunosuppressed status (H)- secondary to Humira injections every other week      Elevated triglycerides with high cholesterol     Controlled substance agreement signed - 6/27/19 - Adderall 5 mg #120 monthly, q6 month OV     Past Surgical History:   Procedure Laterality Date     HC COLONOSCOPY THRU STOMA, DIAGNOSTIC  9/2007, 11/11, 8/16    colitis - repeat in 5 years     HERNIA REPAIR, INGUINAL RT/LT  8/07    right inguinal     HERNIORRHAPHY INCISIONAL (LOCATION)  12/10    incarcerated incisional hernia repair     SURGICAL HISTORY OF -   7/10    ileocolic resection due to Crohns disease     SURGICAL HISTORY OF -   1994    right ACL reconstruction       Social History     Tobacco Use     Smoking status: Never Smoker     Smokeless tobacco: Never Used   Substance Use Topics     Alcohol use: Yes     Alcohol/week: 0.0 - 0.5 oz     Comment: 1-2 drinks per month     Family History   Problem Relation Age of Onset     Depression Father      Attention Deficit Disorder Father      Depression Brother      Attention Deficit Disorder Brother      Diabetes Paternal Grandmother      Arthritis Paternal Grandmother      Cancer Maternal Grandmother         lymphoma         Current Outpatient Medications   Medication Sig Dispense Refill     amphetamine-dextroamphetamine (ADDERALL) 5 MG tablet Take 2 tablets in the morning, then 1 tab around noon, and 1 tab in the afternoon. 120 tablet 0     [START ON 7/27/2019] amphetamine-dextroamphetamine (ADDERALL) 5 MG tablet Take 2 tablets in the morning, then 1 tab around noon, and 1 tab in the afternoon. 120 tablet 0     [START ON 8/26/2019] amphetamine-dextroamphetamine (ADDERALL) 5 MG tablet Take 2 tablets in the morning, then  "1 tab around noon, and 1 tab in the afternoon. 120 tablet 0     HUMIRA PEN 40 MG/0.8ML SC KIT   0     omega-3 acid ethyl esters (LOVAZA) 1 g capsule Take 2 capsules (2 g) by mouth 2 times daily 360 capsule 1     omega-3 acid ethyl esters (LOVAZA) 1 g capsule Take 2 capsules (2 g) by mouth 2 times daily 360 capsule 1     sertraline (ZOLOFT) 50 MG tablet Take 1 tablet (50 mg) by mouth daily 90 tablet 1     Allergies   Allergen Reactions     Nadira Batemants to move       Reviewed and updated as needed this visit by Provider  Tobacco  Allergies  Meds  Problems  Med Hx  Surg Hx  Fam Hx         Review of Systems   ROS COMP: Constitutional, HEENT, cardiovascular, pulmonary, GI, , musculoskeletal, neuro, skin, endocrine and psych systems are negative, except as otherwise noted.      Objective    /80   Pulse 82   Temp 97.6  F (36.4  C) (Oral)   Ht 1.753 m (5' 9\")   Wt 85.7 kg (189 lb)   SpO2 96%   BMI 27.91 kg/m    Body mass index is 27.91 kg/m .  Physical Exam   GENERAL: healthy, alert and no distress  RESP: lungs clear to auscultation - no rales, rhonchi or wheezes  CV: regular rate and rhythm, normal S1 S2, no S3 or S4, no murmur, click or rub, no peripheral edema and peripheral pulses strong  MS: no gross musculoskeletal defects noted, no edema  SKIN: no suspicious lesions or rashes  NEURO: Normal strength and tone, mentation intact and speech normal  PSYCH: mentation appears normal, affect normal/bright    Diagnostic Test Results:  none         Assessment & Plan     Richard was seen today for recheck medication.    Diagnoses and all orders for this visit:    Attention deficit hyperactivity disorder (ADHD), combined type,   Controlled substance agreement signed - 6/27/19 - Adderall 5 mg #120 monthly, q6 month OV  Working well.  Refilled x 3 months today.  3 hard copy RX's walked to our [facility] pharmacy.    -     amphetamine-dextroamphetamine (ADDERALL) 5 MG tablet; Take 2 tablets in the morning, " "then 1 tab around noon, and 1 tab in the afternoon.  -     amphetamine-dextroamphetamine (ADDERALL) 5 MG tablet; Take 2 tablets in the morning, then 1 tab around noon, and 1 tab in the afternoon.  -     amphetamine-dextroamphetamine (ADDERALL) 5 MG tablet; Take 2 tablets in the morning, then 1 tab around noon, and 1 tab in the afternoon.    Anxiety state  Stable.  Refilled today.  -     sertraline (ZOLOFT) 50 MG tablet; Take 1 tablet (50 mg) by mouth daily    Elevated triglycerides with high cholesterol  Reached out to MTM and Humira can cause elevated LDL, but generally NOT triglycerides.  Limiting ETOH recommended.   Pt will ask father about FHx and recommend start of Lovaza and recheck in 6 months.  -     omega-3 acid ethyl esters (LOVAZA) 1 g capsule; Take 2 capsules (2 g) by mouth 2 times daily         BMI:   Estimated body mass index is 27.91 kg/m  as calculated from the following:    Height as of this encounter: 1.753 m (5' 9\").    Weight as of this encounter: 85.7 kg (189 lb).   Weight management plan: Discussed healthy diet and exercise guidelines        Return in about 6 months (around 12/27/2019) for Physical Exam, Lab Work.    Christine Payne PA-C  [facility] [place]      "

## 2019-06-28 ENCOUNTER — TELEPHONE (OUTPATIENT)
Dept: FAMILY MEDICINE | Facility: CLINIC | Age: 49
End: 2019-06-28

## 2019-06-28 DIAGNOSIS — E78.2 ELEVATED TRIGLYCERIDES WITH HIGH CHOLESTEROL: Primary | ICD-10-CM

## 2019-06-28 ASSESSMENT — ANXIETY QUESTIONNAIRES: GAD7 TOTAL SCORE: 0

## 2019-06-28 NOTE — TELEPHONE ENCOUNTER
Prior Authorization Retail Medication Request    Medication/Dose: Omega 3 Acid Ethyl Esters capsule  ICD code (if different than what is on RX):    Previously Tried and Failed:    Rationale:  See Jaziel discussion on 6/27/19    Insurance Name:    Insurance ID:        Pharmacy Information (if different than what is on RX)  Name:  [facility]  Phone:

## 2019-07-03 NOTE — TELEPHONE ENCOUNTER
Central Prior Authorization Team   Phone: [phone]    PA Initiation    Medication: Omega 3 Acid Ethyl Esters capsule- INITIATED  Insurance Company: [facility] - Phone [phone] Fax [phone]  Pharmacy Filling the Rx: [facility] [place], [place] - [address]  Filling Pharmacy Phone: [phone]  Filling Pharmacy Fax:    Start Date: 7/3/2019

## 2019-07-08 RX ORDER — LANOLIN ALCOHOL/MO/W.PET/CERES
500 CREAM (GRAM) TOPICAL AT BEDTIME
Qty: 90 TABLET | Refills: 0 | Status: SHIPPED | OUTPATIENT
Start: 2019-07-08 | End: 2024-04-30

## 2019-07-08 NOTE — TELEPHONE ENCOUNTER
Attempt #1  Called patient @ [phone] - Left a non-detailed message to call back and speak with any triage nurse.    Sharon Webster RN  [place] [facility]

## 2019-07-08 NOTE — TELEPHONE ENCOUNTER
Advise pt that Lovaza is not covered by insurance at this time.  Recommend niacin (slo-niacin 500 mg at night - can cause flushing which usually resolves in ~1 hour) in addition to OTC Omega 3 fish oil 2000 mg once daily.      Still plan to recheck lipid in 6 weeks with lab only fasting visit.  If not at goal (likely will not be) we can appeal the denied Lovaza.      Christine Payne, MS, PA-C

## 2019-07-08 NOTE — TELEPHONE ENCOUNTER
Central Prior Authorization Team   Phone: [phone]    PRIOR AUTHORIZATION DENIED    Medication: Omega 3 Acid Ethyl Esters capsule- DENIED    Denial Date: 7/3/2019    Denial Rational: Patient has to try/fail: niacin, a fibrate, a statin        Appeal Information: If provider would like to appeal we will need a detailed letter of medical necessity to start the process. Then re-route this request back to the PA pool.

## 2019-07-09 NOTE — TELEPHONE ENCOUNTER
Called #   Telephone Information:   Mobile [phone]     Advised pt on the information below     Patient stated an understanding and agreed with plan.      Georgina Lovell RN, BSN  [place] [facility]

## 2019-09-29 ENCOUNTER — HEALTH MAINTENANCE LETTER (OUTPATIENT)
Age: 49
End: 2019-09-29

## 2019-11-06 ENCOUNTER — OFFICE VISIT (OUTPATIENT)
Dept: FAMILY MEDICINE | Facility: CLINIC | Age: 49
End: 2019-11-06
Payer: COMMERCIAL

## 2019-11-06 VITALS
TEMPERATURE: 98.1 F | BODY MASS INDEX: 28.88 KG/M2 | DIASTOLIC BLOOD PRESSURE: 80 MMHG | OXYGEN SATURATION: 95 % | SYSTOLIC BLOOD PRESSURE: 122 MMHG | HEIGHT: 69 IN | HEART RATE: 99 BPM | WEIGHT: 195 LBS

## 2019-11-06 DIAGNOSIS — F90.2 ATTENTION DEFICIT HYPERACTIVITY DISORDER (ADHD), COMBINED TYPE: Primary | ICD-10-CM

## 2019-11-06 DIAGNOSIS — Z23 NEED FOR PROPHYLACTIC VACCINATION AND INOCULATION AGAINST INFLUENZA: ICD-10-CM

## 2019-11-06 DIAGNOSIS — Z12.5 SCREENING PSA (PROSTATE SPECIFIC ANTIGEN): ICD-10-CM

## 2019-11-06 DIAGNOSIS — K50.80 CROHN'S DISEASE OF BOTH SMALL AND LARGE INTESTINE WITHOUT COMPLICATION (H): ICD-10-CM

## 2019-11-06 DIAGNOSIS — Z79.899 CONTROLLED SUBSTANCE AGREEMENT SIGNED: ICD-10-CM

## 2019-11-06 DIAGNOSIS — E78.2 ELEVATED TRIGLYCERIDES WITH HIGH CHOLESTEROL: ICD-10-CM

## 2019-11-06 DIAGNOSIS — F41.1 ANXIETY STATE: ICD-10-CM

## 2019-11-06 LAB — LIPASE SERPL-CCNC: 160 U/L (ref 73–393)

## 2019-11-06 PROCEDURE — 83690 ASSAY OF LIPASE: CPT | Performed by: PHYSICIAN ASSISTANT

## 2019-11-06 PROCEDURE — 80061 LIPID PANEL: CPT | Performed by: PHYSICIAN ASSISTANT

## 2019-11-06 PROCEDURE — 99000 SPECIMEN HANDLING OFFICE-LAB: CPT | Performed by: PHYSICIAN ASSISTANT

## 2019-11-06 PROCEDURE — 90686 IIV4 VACC NO PRSV 0.5 ML IM: CPT | Performed by: PHYSICIAN ASSISTANT

## 2019-11-06 PROCEDURE — 80307 DRUG TEST PRSMV CHEM ANLYZR: CPT | Mod: 90 | Performed by: PHYSICIAN ASSISTANT

## 2019-11-06 PROCEDURE — 36415 COLL VENOUS BLD VENIPUNCTURE: CPT | Performed by: PHYSICIAN ASSISTANT

## 2019-11-06 PROCEDURE — 80053 COMPREHEN METABOLIC PANEL: CPT | Performed by: PHYSICIAN ASSISTANT

## 2019-11-06 PROCEDURE — 99214 OFFICE O/P EST MOD 30 MIN: CPT | Mod: 25 | Performed by: PHYSICIAN ASSISTANT

## 2019-11-06 PROCEDURE — G0103 PSA SCREENING: HCPCS | Performed by: PHYSICIAN ASSISTANT

## 2019-11-06 PROCEDURE — 96127 BRIEF EMOTIONAL/BEHAV ASSMT: CPT | Performed by: PHYSICIAN ASSISTANT

## 2019-11-06 PROCEDURE — 90471 IMMUNIZATION ADMIN: CPT | Performed by: PHYSICIAN ASSISTANT

## 2019-11-06 RX ORDER — METAPROTERENOL SULFATE 10 MG
2000 TABLET ORAL 2 TIMES DAILY
COMMUNITY
Start: 2019-11-06 | End: 2021-04-23 | Stop reason: ALTCHOICE

## 2019-11-06 RX ORDER — DEXTROAMPHETAMINE SACCHARATE, AMPHETAMINE ASPARTATE, DEXTROAMPHETAMINE SULFATE AND AMPHETAMINE SULFATE 1.25; 1.25; 1.25; 1.25 MG/1; MG/1; MG/1; MG/1
TABLET ORAL
Qty: 120 TABLET | Refills: 0 | Status: SHIPPED | OUTPATIENT
Start: 2019-11-06 | End: 2020-01-02

## 2019-11-06 ASSESSMENT — ANXIETY QUESTIONNAIRES
6. BECOMING EASILY ANNOYED OR IRRITABLE: NOT AT ALL
5. BEING SO RESTLESS THAT IT IS HARD TO SIT STILL: NOT AT ALL
IF YOU CHECKED OFF ANY PROBLEMS ON THIS QUESTIONNAIRE, HOW DIFFICULT HAVE THESE PROBLEMS MADE IT FOR YOU TO DO YOUR WORK, TAKE CARE OF THINGS AT HOME, OR GET ALONG WITH OTHER PEOPLE: NOT DIFFICULT AT ALL
2. NOT BEING ABLE TO STOP OR CONTROL WORRYING: NOT AT ALL
3. WORRYING TOO MUCH ABOUT DIFFERENT THINGS: NOT AT ALL
7. FEELING AFRAID AS IF SOMETHING AWFUL MIGHT HAPPEN: NOT AT ALL
GAD7 TOTAL SCORE: 0
1. FEELING NERVOUS, ANXIOUS, OR ON EDGE: NOT AT ALL

## 2019-11-06 ASSESSMENT — PATIENT HEALTH QUESTIONNAIRE - PHQ9
5. POOR APPETITE OR OVEREATING: NOT AT ALL
SUM OF ALL RESPONSES TO PHQ QUESTIONS 1-9: 0

## 2019-11-06 ASSESSMENT — MIFFLIN-ST. JEOR: SCORE: 1744.89

## 2019-11-06 NOTE — PROGRESS NOTES
SUBJECTIVE:   Richard Loza is a 49 year old male who presents to clinic today for the following health issues:    Anxiety Follow-Up    How are you doing with your anxiety since your last visit? No change    Are you having other symptoms that might be associated with anxiety? No    Have you had a significant life event? No     Are you feeling depressed? No    Do you have any concerns with your use of alcohol or other drugs? No    Anxiety has been stable with sertraline medication. No suicidal ideation, depression or worsening anxiety noted.     Social History     Tobacco Use     Smoking status: Never Smoker     Smokeless tobacco: Never Used   Substance Use Topics     Alcohol use: Yes     Alcohol/week: 0.0 - 0.8 standard drinks     Comment: 1 drink/week     Drug use: No     JANAK-7 SCORE 3/15/2019 6/27/2019 11/6/2019   Total Score - - -   Total Score 0 0 0     PHQ 3/15/2019 6/27/2019 11/6/2019   PHQ-9 Total Score 0 0 0   Q9: Thoughts of better off dead/self-harm past 2 weeks Not at all Not at all Not at all     Last PHQ-9 11/6/2019   1.  Little interest or pleasure in doing things 0   2.  Feeling down, depressed, or hopeless 0   3.  Trouble falling or staying asleep, or sleeping too much 0   4.  Feeling tired or having little energy 0   5.  Poor appetite or overeating 0   6.  Feeling bad about yourself 0   7.  Trouble concentrating 0   8.  Moving slowly or restless 0   Q9: Thoughts of better off dead/self-harm past 2 weeks 0   PHQ-9 Total Score 0   Difficulty at work, home, or with people Not difficult at all         How many servings of fruits and vegetables do you eat daily?  2-3    On average, how many sweetened beverages do you drink each day (soda, juice, sweet tea, etc)?   1  How many days per week do you miss taking your medication? 1    What makes it hard for you to take your medications?  choosing not to take adderall on weekends occasionally     Medication Followup of Adderall 5mg Tablet       Patient has  been taking Adderall for 1 year now with no side effects. Takes medications as prescribed. Denies any sleep disturbances.     Taking Medication as prescribed: yes    Side Effects:  None    Medication Helping Symptoms:  yes       Hyperlipidemia Follow-Up  On Niacin x 2 months, OTC fish oil (insurance didn't cover Lovaza).  Dad has high triglycerides too    Watching cholesterol in diet?: Yes    Any muscle aches?: No     Recent Labs   Lab Test 07/23/18  1009 10/18/16  0912  10/19/11  1140   CHOL 161 144  --  124   HDL 35* 29*  --  29*   LDL Cannot estimate LDL when triglyceride exceeds 400 mg/dL  85 Cannot estimate LDL when triglyceride exceeds 400 mg/dL  66   < > 27   TRIG 602* 533*  --  340*   CHOLHDLRATIO  --   --   --  4.3    < > = values in this interval not displayed.       Problem list and histories reviewed & adjusted, as indicated.  Additional history: as documented    Patient Active Problem List   Diagnosis     Family history of diabetes mellitus     Anxiety state     Crohns disease     Seasonal allergies     Attention deficit hyperactivity disorder (ADHD), combined type     Immunosuppressed status (H)- secondary to Humira injections every other week      Elevated triglycerides with high cholesterol     Controlled substance agreement signed - 6/27/19 - Adderall 5 mg #120 monthly, q6 month OV     Past Surgical History:   Procedure Laterality Date     HC COLONOSCOPY THRU STOMA, DIAGNOSTIC  9/2007, 11/11, 8/16    colitis - repeat in 5 years     HERNIA REPAIR, INGUINAL RT/LT  8/07    right inguinal     HERNIORRHAPHY INCISIONAL (LOCATION)  12/10    incarcerated incisional hernia repair     SURGICAL HISTORY OF -   7/10    ileocolic resection due to Crohns disease     SURGICAL HISTORY OF -   1994    right ACL reconstruction       Social History     Tobacco Use     Smoking status: Never Smoker     Smokeless tobacco: Never Used   Substance Use Topics     Alcohol use: Yes     Alcohol/week: 0.0 - 0.8 standard drinks  "    Comment: 1 drink/week     Family History   Problem Relation Age of Onset     Depression Father      Attention Deficit Disorder Father      Hyperlipidemia Father         elevated triglycerides     Depression Brother      Attention Deficit Disorder Brother      Diabetes Paternal Grandmother      Arthritis Paternal Grandmother      Cancer Maternal Grandmother         lymphoma         Current Outpatient Medications   Medication Sig Dispense Refill     amphetamine-dextroamphetamine (ADDERALL) 5 MG tablet Take 2 tablets in the morning, then 1 tab around noon, and 1 tab in the afternoon. 120 tablet 0     HUMIRA PEN 40 MG/0.8ML SC KIT   0     niacin (SLO-NIACIN) 500 MG CR tablet Take 1 tablet (500 mg) by mouth At Bedtime 90 tablet 0     Omega-3 Fish Oil 500 MG capsule Take 4 capsules (2,000 mg) by mouth 2 times daily       sertraline (ZOLOFT) 50 MG tablet Take 1 tablet (50 mg) by mouth daily 90 tablet 1     Allergies   Allergen Reactions     Levaquin      Hurts to move       Reviewed and updated as needed this visit by clinical staff  Tobacco  Allergies  Meds  Problems  Med Hx  Surg Hx  Fam Hx  Soc Hx        Reviewed and updated as needed this visit by Provider  Tobacco  Allergies  Meds  Problems  Med Hx  Surg Hx  Fam Hx  Soc Hx          ROS:  Constitutional, HEENT, cardiovascular, pulmonary, GI, , musculoskeletal, neuro, skin, endocrine and psych systems are negative, except as otherwise noted.    OBJECTIVE:   /80 (BP Location: Left arm, Cuff Size: Adult Regular)   Pulse 99   Temp 98.1  F (36.7  C) (Oral)   Ht 1.753 m (5' 9\")   Wt 88.5 kg (195 lb)   SpO2 95%   BMI 28.80 kg/m   Body mass index is 28.8 kg/m .    GENERAL: healthy, alert and no distress  EYES: Eyes grossly normal to inspection, PERRL and conjunctivae and sclerae normal  HENT: ear canals and TM's normal, nose and mouth without ulcers or lesions  NECK: no adenopathy, no asymmetry, masses, or scars and thyroid normal to " palpation  RESP: lungs clear to auscultation - no rales, rhonchi or wheezes  CV: regular rate and rhythm, normal S1 S2, no S3 or S4, no murmur, click or rub, no peripheral edema and peripheral pulses strong  MS: no gross musculoskeletal defects noted, no edema  SKIN: no suspicious lesions or rashes  NEURO: Normal strength and tone, mentation intact and speech normal  PSYCH: mentation appears normal, affect normal/bright    Diagnostic Test Results:  No results found for this or any previous visit (from the past 24 hour(s)).  ASSESSMENT/PLAN:   Richard was seen today for recheck medication and imm/inj.    Diagnoses and all orders for this visit:    Attention deficit hyperactivity disorder (ADHD), combined type  Controlled substance agreement signed - 6/27/19 - Adderall 5 mg #120 monthly, q6 month OV  Stable. Well-controlled with medication, he has been taking as prescribed for >1 year now. No insomnia. Pt needs refill of medication today.  -     amphetamine-dextroamphetamine (ADDERALL) 5 MG tablet; Take 2 tablets in the morning, then 1 tab around noon, and 1 tab in the afternoon.  -     Drug  Screen Comprehensive , Urine with Reported Meds ([facility]) (Pain Care Package)      Elevated triglycerides with high cholesterol  Trigs in 2018 were > 600. Pt was informed of this and he has been exercising 3-4 x/week for 60 min. Still eating fruits and vegetables but also likes red meat and fatty foods. FHx with hypertriglyceridemia on father's side. Taking niacin and fish oil. Plan to recheck labs today and monitor for pancreatitis. Seems to be genetic related.   -     Lipid panel reflex to direct LDL Fasting  -     Comprehensive metabolic panel  -     Lipase  -     Omega-3 Fish Oil 500 MG capsule; Take 4 capsules (2,000 mg) by mouth 2 times daily    Need for prophylactic vaccination and inoculation against influenza  Received today in clinic.  -     INFLUENZA VACCINE IM > 6 MONTHS VALENT IIV4 [12210]  -     Vaccine Administration,  Initial [58359]    Anxiety state  Stable. Taking medication as prescribed with good symptom relief.   -     sertraline (ZOLOFT) 50 MG tablet; Take 1 tablet (50 mg) by mouth daily    Screening PSA (prostate specific antigen)  Routine screening. Last PSA was normal.   -     PSA, screen    Crohns disease (H) - crohn's - ileocolonic; followed by Dr. Lopez every 6 months  Reports good control      Return in about 6 months (around 5/6/2020) for Phone visit (ADD follow up).      Christine Payne PA-C  [facility] [place]

## 2019-11-07 LAB
ALBUMIN SERPL-MCNC: 4.4 G/DL (ref 3.4–5)
ALP SERPL-CCNC: 93 U/L (ref 40–150)
ALT SERPL W P-5'-P-CCNC: 55 U/L (ref 0–70)
ANION GAP SERPL CALCULATED.3IONS-SCNC: 5 MMOL/L (ref 3–14)
AST SERPL W P-5'-P-CCNC: 34 U/L (ref 0–45)
BILIRUB SERPL-MCNC: 0.9 MG/DL (ref 0.2–1.3)
BUN SERPL-MCNC: 18 MG/DL (ref 7–30)
CALCIUM SERPL-MCNC: 8.9 MG/DL (ref 8.5–10.1)
CHLORIDE SERPL-SCNC: 105 MMOL/L (ref 94–109)
CHOLEST SERPL-MCNC: 182 MG/DL
CO2 SERPL-SCNC: 27 MMOL/L (ref 20–32)
CREAT SERPL-MCNC: 0.96 MG/DL (ref 0.66–1.25)
GFR SERPL CREATININE-BSD FRML MDRD: >90 ML/MIN/{1.73_M2}
GLUCOSE SERPL-MCNC: 94 MG/DL (ref 70–99)
HDLC SERPL-MCNC: 47 MG/DL
LDLC SERPL CALC-MCNC: 107 MG/DL
NONHDLC SERPL-MCNC: 135 MG/DL
POTASSIUM SERPL-SCNC: 4.4 MMOL/L (ref 3.4–5.3)
PROT SERPL-MCNC: 7.9 G/DL (ref 6.8–8.8)
PSA SERPL-ACNC: 0.52 UG/L (ref 0–4)
SODIUM SERPL-SCNC: 137 MMOL/L (ref 133–144)
TRIGL SERPL-MCNC: 141 MG/DL

## 2019-11-07 ASSESSMENT — ANXIETY QUESTIONNAIRES: GAD7 TOTAL SCORE: 0

## 2019-11-11 LAB — PAIN DRUG SCR UR W RPTD MEDS: NORMAL

## 2019-11-11 NOTE — RESULT ENCOUNTER NOTE
Richard  I have reviewed your recent labs. Here are the results:    -PSA (prostate specific antigen) test is normal.  This indicates a low likelihood of prostate cancer.  ADVISE: rechecking this in 1 year.  -Cholesterol levels are at your goal levels and MARKEDLY improved with the Lovaza!  ADVISE: continuing your medication, a regular exercise program with at least 150 minutes of aerobic exercise per week, and eating a low saturated fat/low carbohydrate diet.  Also, you should recheck this fasting cholesterol panel in 12 months.  -Liver and gallbladder tests are normal (ALT,AST, Alk phos, bilirubin), kidney function is normal (Cr, GFR), sodium is normal, potassium is normal, calcium is normal, glucose is normal.  -Drug screen showed expected results.  -Lipase is normal (pancreatic function)    For additional lab test information, [email or web address] is an excellent reference.     If you have any questions please do not hesitate to contact our office via phone ([phone]) or MyChart.    Christine Payne, MS, PA-C  [facility] [place]

## 2020-01-02 DIAGNOSIS — F90.2 ATTENTION DEFICIT HYPERACTIVITY DISORDER (ADHD), COMBINED TYPE: ICD-10-CM

## 2020-01-02 DIAGNOSIS — Z79.899 CONTROLLED SUBSTANCE AGREEMENT SIGNED: ICD-10-CM

## 2020-01-02 RX ORDER — DEXTROAMPHETAMINE SACCHARATE, AMPHETAMINE ASPARTATE, DEXTROAMPHETAMINE SULFATE AND AMPHETAMINE SULFATE 1.25; 1.25; 1.25; 1.25 MG/1; MG/1; MG/1; MG/1
TABLET ORAL
Qty: 120 TABLET | Refills: 0 | Status: SHIPPED | OUTPATIENT
Start: 2020-01-02 | End: 2020-02-05

## 2020-01-02 NOTE — TELEPHONE ENCOUNTER
Controlled Substance Refill Request for amphetamine-dextroamphetamine (ADDERALL) 5 MG tablet  Problem List Complete:    Yes    Last Written Prescription Date:  11.6.19  Last Fill Quantity: 120 tablet,   # refills: 0    [place] [facility]: 11.6.19    Future Office visit:   Next 5 appointments (look out 90 days)    Jan 16, 2020  3:40 PM CST  [facility] Christine [facility]-C  [facility] [address] [place] [address]  [phone]          Controlled substance agreement:   Encounter-Level CSA - 10/31/2018:    Controlled Substance Agreement - Scan on 11/5/2018 11:19 AM: CONTROLLED SUBSTANCE AGREEMENT     Encounter-Level CSA - 10/09/2017:    Controlled Substance Agreement - Scan on 10/17/2017 12:09 PM: CONTROLLED SUBSTANCE AGREEMENT     Encounter-Level CSA - 09/25/2017:    Controlled Substance Agreement - Scan on 10/3/2017  1:58 PM: CONTROLLED SUBSTANCE AGREEMENT     Patient-Level CSA:    Controlled Substance Agreement - Non - Opioid - Scan on 7/11/2019  6:56 AM: NON-OPIOID CONTROLLED SUBSTANCE AGREEMENT         Last Urine Drug Screen:   Pain Drug SCR UR W RPTD Meds   Date Value Ref Range Status   11/06/2019 FINAL  Final     Comment:     (Note)  ====================================================================  TOXAS[facility] COMP DRUG ANALYSIS,UR  ====================================================================  Test                             Result       Flag       Units        Drug Present and Declared for Prescription Verification   Amphetamine                    459          EXPECTED   ng/mg creat    Amphetamine is available as a schedule II prescription drug.  Drug Present not Declared for Prescription Verification   Sertraline                     PRESENT      UNEXPECTED               Desmethylsertraline            PRESENT      UNEXPECTED                Desmethylsertraline is an expected metabolite of  sertraline.  ====================================================================  Test                      Result    Flag   Units      Ref Range        Creatinine              140              mg/dL      >=20            ====================================================================  Declared Medications:  The flagging and interpretation on this report are based on the  following declared medications.  Unexpected results may arise from  inaccuracies in the declared medications.  **Note: The testing scope of this panel includes these medications:  Amphetamine  Amphetamine (Adderall)  ====================================================================  For clinical consultation, please call [phone].  ====================================================================  Analysis performed by [employer] [place] [address]     ,   [facility] Drug Analysis UR   Date Value Ref Range Status   10/09/2017 FINAL  Final     Comment:     (Note)  ====================================================================  COMPREHENSIVE DRUG ANALYSIS,UR  ====================================================================  Test                             Result       Flag       Units        Drug Present   Amphetamine                    2400                    ng/mg creat    Amphetamine is available as a schedule II prescription drug.   Sertraline                     PRESENT                               Desmethylsertraline            PRESENT                                Desmethylsertraline is an expected metabolite of sertraline.  ====================================================================  Test                      Result    Flag   Units      Ref Range        Creatinine              226              mg/dL      >=20            ====================================================================  For clinical consultation, please call [phone].  ====================================================================  Analysis performed by [employer] [place] [address]     , No results found for: THC13, PCP13, COC13, MAMP13, OPI13, AMP13, BZO13, TCA13, MTD13, BAR13, OXY13, PPX13, BUP13     Processing:  Fax Rx to listed pharmacy    [email or web address]   checked in past 3 months?  No, route to RN

## 2020-02-03 NOTE — PROGRESS NOTES
SUBJECTIVE:   Richard Loza is a 49 year old male who presents to clinic today for the following health issues:    Anxiety Follow-Up  Mood has been going well. Anxiety has been stable with sertraline medication. No suicidal ideation, depression or worsening anxiety noted.     How are you doing with your anxiety since your last visit? No change    Are you having other symptoms that might be associated with anxiety? No    Have you had a significant life event? No     Are you feeling depressed? No    Do you have any concerns with your use of alcohol or other drugs? No    JANAK-7 SCORE 6/27/2019 11/6/2019 2/5/2020   Total Score - - -   Total Score 0 0 0     PHQ 6/27/2019 11/6/2019 2/5/2020   PHQ-9 Total Score 0 0 0   Q9: Thoughts of better off dead/self-harm past 2 weeks Not at all Not at all Not at all         How many servings of fruits and vegetables do you eat daily?  2-3    On average, how many sweetened beverages do you drink each day (Examples: soda, juice, sweet tea, etc.  Do NOT count diet or artificially sweetened beverages)?   1    How many days per week do you exercise enough to make your heart beat faster? 4    How many minutes a day do you exercise enough to make your heart beat faster? 30 - 60    How many days per week do you miss taking your medication? 0      Medication Followup of Adderall 5mg Tablet    Patient has been taking Adderall for 1 year now with no side effects. Denies heart palpitation. Takes medications as prescribed. Denies any sleep disturbances.     Taking Medication as prescribed: yes    Side Effects:  None    Medication Helping Symptoms:  yes        Hyperlipidemia Follow-Up  On Niacin & aspirin  OTC fish oil (insurance didn't cover Lovaza).  Dad has high triglycerides too. Today patients lipid panel have improved.    Watching cholesterol in diet?: Yes    Any muscle aches?: No      Recent Labs   Lab Test 11/06/19  1039 07/23/18  1009   CHOL 182 161   HDL 47 35*   * Cannot  estimate LDL when triglyceride exceeds 400 mg/dL  85   TRIG 141 602*       Social History     Tobacco Use     Smoking status: Never Smoker     Smokeless tobacco: Never Used   Substance Use Topics     Alcohol use: Yes     Alcohol/week: 0.0 - 0.8 standard drinks     Comment: 1 drink/week     Drug use: No       Problem list and histories reviewed & adjusted, as indicated.  Additional history: as documented    Patient Active Problem List   Diagnosis     Family history of diabetes mellitus     Anxiety state     Seasonal allergies     Attention deficit hyperactivity disorder (ADHD), combined type     Immunosuppressed status (H)- secondary to Humira injections every other week      Elevated triglycerides with high cholesterol     Controlled substance agreement signed - 6/27/19 - Adderall 5 mg #120 monthly, q6 month OV     Crohns disease (H) - crohn's - ileocolonic; followed by Dr. Lopez every 6 months     Past Surgical History:   Procedure Laterality Date     HC COLONOSCOPY THRU STOMA, DIAGNOSTIC  9/2007, 11/11, 8/16    colitis - repeat in 5 years     HERNIA REPAIR, INGUINAL RT/LT  8/07    right inguinal     HERNIORRHAPHY INCISIONAL (LOCATION)  12/10    incarcerated incisional hernia repair     SURGICAL HISTORY OF -   7/10    ileocolic resection due to Crohns disease     SURGICAL HISTORY OF -   1994    right ACL reconstruction       Social History     Tobacco Use     Smoking status: Never Smoker     Smokeless tobacco: Never Used   Substance Use Topics     Alcohol use: Yes     Alcohol/week: 0.0 - 0.8 standard drinks     Comment: 1 drink/week     Family History   Problem Relation Age of Onset     Depression Father      Attention Deficit Disorder Father      Hyperlipidemia Father         elevated triglycerides     Depression Brother      Attention Deficit Disorder Brother      Diabetes Paternal Grandmother      Arthritis Paternal Grandmother      Cancer Maternal Grandmother         lymphoma         Current Outpatient  "Medications   Medication Sig Dispense Refill     amphetamine-dextroamphetamine (ADDERALL) 5 MG tablet TAKE TWO TABLETS BY MOUTH IN THE MORNING, TAKE ONE TABLET AROUND NOON, AND TAKE ONE TABLET IN THE AFTERNOON. 120 tablet 0     amphetamine-dextroamphetamine (ADDERALL) 5 MG tablet Take 2 tablets (10 mg) by mouth every morning AND 1 tablet (5 mg) daily (with lunch) AND 1 tablet (5 mg) daily. In the afternoon 120 tablet 0     [START ON 4/5/2020] amphetamine-dextroamphetamine (ADDERALL) 5 MG tablet Take 2 tablets (10 mg) by mouth every morning AND 1 tablet (5 mg) daily (with lunch) AND 1 tablet (5 mg) daily. In the afternoon 120 tablet 0     HUMIRA PEN 40 MG/0.8ML SC KIT   0     niacin (SLO-NIACIN) 500 MG CR tablet Take 1 tablet (500 mg) by mouth At Bedtime 90 tablet 0     Omega-3 Fish Oil 500 MG capsule Take 4 capsules (2,000 mg) by mouth 2 times daily       sertraline (ZOLOFT) 50 MG tablet Take 1 tablet (50 mg) by mouth daily 90 tablet 1     Allergies   Allergen Reactions     Levaquin      Hurts to move       Reviewed and updated as needed this visit by clinical staff  Tobacco  Allergies  Meds  Problems  Med Hx  Surg Hx  Fam Hx  Soc Hx        Reviewed and updated as needed this visit by Provider  Tobacco  Allergies  Meds  Problems  Med Hx  Surg Hx  Fam Hx         ROS:  Constitutional, HEENT, cardiovascular, pulmonary, GI, , musculoskeletal, neuro, skin, endocrine and psych systems are negative, except as otherwise noted.    This document serves as a record of the services and decisions personally performed and made by Christine Payne PA-C. It was created on her behalf by Elizabeth Dudley, a trained medical scribe. The creation of this document is based on the provider's statements to the medical scribe.  Elizabeth Dudley 5:53 PM February 3, 2020  OBJECTIVE:   /80 (BP Location: Left arm, Cuff Size: Adult Regular)   Pulse 87   Temp 98.6  F (37  C) (Oral)   Ht 1.753 m (5' 9\")   Wt 88.5 kg (195 lb)   SpO2 " 97%   BMI 28.80 kg/m   Body mass index is 28.8 kg/m .    Physical Exam  GENERAL: healthy, alert and no distress  EYES: Eyes grossly normal to inspection, PERRL and conjunctivae and sclerae normal  RESP: lungs clear to auscultation - no rales, rhonchi or wheezes  CV: regular rate and rhythm, normal S1 S2, no S3 or S4, no murmur, click or rub, no peripheral edema and peripheral pulses strong  MS: no gross musculoskeletal defects noted, no edema  SKIN: no suspicious lesions or rashes  NEURO: Normal strength and tone, mentation intact and speech normal  PSYCH: mentation appears normal, affect normal/bright    Diagnostic Test Results:  No results found for this or any previous visit (from the past 24 hour(s)).  ASSESSMENT/PLAN:   Richard was seen today for recheck medication.    Diagnoses and all orders for this visit:    Anxiety state  Stable.Taking medication as prescribed with good symptom relief.   -     sertraline (ZOLOFT) 50 MG tablet; Take 1 tablet (50 mg) by mouth daily    Attention deficit hyperactivity disorder (ADHD), combined type  Controlled substance agreement signed - 6/27/19 - Adderall 5 mg #120 monthly, q6 month OV  Stable. Well-controlled with medication, he has been taking as prescribed for >1 year now. No insomnia. Pt needs refill of medication today.  -     amphetamine-dextroamphetamine (ADDERALL) 5 MG tablet; TAKE TWO TABLETS BY MOUTH IN THE MORNING, TAKE ONE TABLET AROUND NOON, AND TAKE ONE TABLET IN THE AFTERNOON.    Elevated triglycerides with high cholesterol  Trigs in 2018 were > 600. FHx with hypertriglyceridemia on father's side. Lipid panels on 11/06/19 have improved since 2018. Continue current regimen of diet/exercise.       Return in about 6 months (around 8/5/2020) for Physical Exam, Lab Work, med check.     [address] [address]  [email or web address]  [email or web address]   Office: [phone]           Christine Payne MS,  JUDIE

## 2020-02-05 ENCOUNTER — OFFICE VISIT (OUTPATIENT)
Dept: FAMILY MEDICINE | Facility: CLINIC | Age: 50
End: 2020-02-05
Payer: COMMERCIAL

## 2020-02-05 VITALS
TEMPERATURE: 98.6 F | HEIGHT: 69 IN | OXYGEN SATURATION: 97 % | BODY MASS INDEX: 28.88 KG/M2 | WEIGHT: 195 LBS | DIASTOLIC BLOOD PRESSURE: 80 MMHG | HEART RATE: 87 BPM | SYSTOLIC BLOOD PRESSURE: 134 MMHG

## 2020-02-05 DIAGNOSIS — Z79.899 CONTROLLED SUBSTANCE AGREEMENT SIGNED: ICD-10-CM

## 2020-02-05 DIAGNOSIS — E78.2 ELEVATED TRIGLYCERIDES WITH HIGH CHOLESTEROL: ICD-10-CM

## 2020-02-05 DIAGNOSIS — F41.1 ANXIETY STATE: Primary | ICD-10-CM

## 2020-02-05 DIAGNOSIS — F90.2 ATTENTION DEFICIT HYPERACTIVITY DISORDER (ADHD), COMBINED TYPE: ICD-10-CM

## 2020-02-05 PROCEDURE — 99214 OFFICE O/P EST MOD 30 MIN: CPT | Performed by: PHYSICIAN ASSISTANT

## 2020-02-05 RX ORDER — DEXTROAMPHETAMINE SACCHARATE, AMPHETAMINE ASPARTATE, DEXTROAMPHETAMINE SULFATE AND AMPHETAMINE SULFATE 1.25; 1.25; 1.25; 1.25 MG/1; MG/1; MG/1; MG/1
TABLET ORAL
Qty: 120 TABLET | Refills: 0 | Status: SHIPPED | OUTPATIENT
Start: 2020-03-06 | End: 2020-09-16

## 2020-02-05 RX ORDER — DEXTROAMPHETAMINE SACCHARATE, AMPHETAMINE ASPARTATE, DEXTROAMPHETAMINE SULFATE AND AMPHETAMINE SULFATE 1.25; 1.25; 1.25; 1.25 MG/1; MG/1; MG/1; MG/1
TABLET ORAL
Qty: 120 TABLET | Refills: 0 | Status: SHIPPED | OUTPATIENT
Start: 2020-02-05 | End: 2020-09-16

## 2020-02-05 RX ORDER — DEXTROAMPHETAMINE SACCHARATE, AMPHETAMINE ASPARTATE, DEXTROAMPHETAMINE SULFATE AND AMPHETAMINE SULFATE 1.25; 1.25; 1.25; 1.25 MG/1; MG/1; MG/1; MG/1
TABLET ORAL
Qty: 120 TABLET | Refills: 0 | Status: SHIPPED | OUTPATIENT
Start: 2020-04-05 | End: 2020-07-31

## 2020-02-05 RX ORDER — DEXTROAMPHETAMINE SACCHARATE, AMPHETAMINE ASPARTATE, DEXTROAMPHETAMINE SULFATE AND AMPHETAMINE SULFATE 1.25; 1.25; 1.25; 1.25 MG/1; MG/1; MG/1; MG/1
TABLET ORAL
Qty: 120 TABLET | Refills: 0 | Status: SHIPPED | OUTPATIENT
Start: 2020-02-05 | End: 2020-02-05

## 2020-02-05 ASSESSMENT — ANXIETY QUESTIONNAIRES
IF YOU CHECKED OFF ANY PROBLEMS ON THIS QUESTIONNAIRE, HOW DIFFICULT HAVE THESE PROBLEMS MADE IT FOR YOU TO DO YOUR WORK, TAKE CARE OF THINGS AT HOME, OR GET ALONG WITH OTHER PEOPLE: NOT DIFFICULT AT ALL
7. FEELING AFRAID AS IF SOMETHING AWFUL MIGHT HAPPEN: NOT AT ALL
2. NOT BEING ABLE TO STOP OR CONTROL WORRYING: NOT AT ALL
1. FEELING NERVOUS, ANXIOUS, OR ON EDGE: NOT AT ALL
6. BECOMING EASILY ANNOYED OR IRRITABLE: NOT AT ALL
GAD7 TOTAL SCORE: 0
5. BEING SO RESTLESS THAT IT IS HARD TO SIT STILL: NOT AT ALL
3. WORRYING TOO MUCH ABOUT DIFFERENT THINGS: NOT AT ALL

## 2020-02-05 ASSESSMENT — PATIENT HEALTH QUESTIONNAIRE - PHQ9
SUM OF ALL RESPONSES TO PHQ QUESTIONS 1-9: 0
5. POOR APPETITE OR OVEREATING: NOT AT ALL

## 2020-02-05 ASSESSMENT — MIFFLIN-ST. JEOR: SCORE: 1744.89

## 2020-02-06 ASSESSMENT — ANXIETY QUESTIONNAIRES: GAD7 TOTAL SCORE: 0

## 2020-05-06 ENCOUNTER — TRANSFERRED RECORDS (OUTPATIENT)
Dept: HEALTH INFORMATION MANAGEMENT | Facility: CLINIC | Age: 50
End: 2020-05-06

## 2020-05-06 LAB
ALT SERPL-CCNC: 53 U/L (ref 0–78)
AST SERPL-CCNC: 37 U/L (ref 0–37)

## 2020-05-08 ENCOUNTER — TRANSFERRED RECORDS (OUTPATIENT)
Dept: HEALTH INFORMATION MANAGEMENT | Facility: CLINIC | Age: 50
End: 2020-05-08

## 2020-07-30 DIAGNOSIS — F90.2 ATTENTION DEFICIT HYPERACTIVITY DISORDER (ADHD), COMBINED TYPE: ICD-10-CM

## 2020-07-30 DIAGNOSIS — Z79.899 CONTROLLED SUBSTANCE AGREEMENT SIGNED: ICD-10-CM

## 2020-07-30 NOTE — TELEPHONE ENCOUNTER
adderall      Last Written Prescription Date:  4/5/20  Last Fill Quantity: 120,   # refills: 0  Last Office Visit: 2/5/20  Future Office visit:       Routing refill request to provider for review/approval because:  Drug not on the [facility], UMP or  Health refill protocol or controlled substance    RX monitoring program ([place]) reviewed:  reviewed- no concerns    [place]:  [email or web address]    Yahaira Grimes RN   [facility] - Triage

## 2020-07-30 NOTE — LETTER
[address] [address] [phone]       August 3, 2020    Richard Loza  [address] [address]    Richard:    We have been calling you regarding a recent refill request we received for Adderall .  Unfortunately, we were unable to reach you.  We are notifying you that you are due for an office visit, either in person at the clinic or a phone or virtual visit   prior to your next refill.  You can schedule this appointment via [employer] or by calling the clinic at [phone].        Sincerely,    Christine Payne PA-C / [email or web address]

## 2020-07-31 RX ORDER — DEXTROAMPHETAMINE SACCHARATE, AMPHETAMINE ASPARTATE, DEXTROAMPHETAMINE SULFATE AND AMPHETAMINE SULFATE 1.25; 1.25; 1.25; 1.25 MG/1; MG/1; MG/1; MG/1
TABLET ORAL
Qty: 120 TABLET | Refills: 0 | Status: SHIPPED | OUTPATIENT
Start: 2020-07-31 | End: 2020-09-16

## 2020-07-31 NOTE — TELEPHONE ENCOUNTER
Fill times 1 month.     Due for office visit in August and renewal of CSA.       Please help set up visit. Virtual is fine.      PRICILLA Younger-BC

## 2020-08-03 NOTE — TELEPHONE ENCOUNTER
Left non-detailed message for patient to call back.  Please schedule office visit  when patient calls back.  (see previous notes for details)      I have been unable to reach this patient by phone.  A letter is being sent to the last known home address.    Thanks Nicki

## 2020-08-07 DIAGNOSIS — Z79.899 CONTROLLED SUBSTANCE AGREEMENT SIGNED: ICD-10-CM

## 2020-08-07 DIAGNOSIS — F90.2 ATTENTION DEFICIT HYPERACTIVITY DISORDER (ADHD), COMBINED TYPE: ICD-10-CM

## 2020-08-11 NOTE — TELEPHONE ENCOUNTER
RX [place] ([place]) reviewed:  reviewed- no concerns 8/11/2020    [place] profile:  [email or web address]    Routing refill request to provider for review/approval because:  Drug not on the [facility] refill protocol     RICHARD Arellano, RN  [employer] Triage

## 2020-08-12 RX ORDER — DEXTROAMPHETAMINE SACCHARATE, AMPHETAMINE ASPARTATE, DEXTROAMPHETAMINE SULFATE AND AMPHETAMINE SULFATE 1.25; 1.25; 1.25; 1.25 MG/1; MG/1; MG/1; MG/1
TABLET ORAL
Qty: 120 TABLET | Refills: 0 | OUTPATIENT
Start: 2020-08-12

## 2020-09-16 ENCOUNTER — VIRTUAL VISIT (OUTPATIENT)
Dept: FAMILY MEDICINE | Facility: CLINIC | Age: 50
End: 2020-09-16
Payer: COMMERCIAL

## 2020-09-16 DIAGNOSIS — F41.1 ANXIETY STATE: ICD-10-CM

## 2020-09-16 DIAGNOSIS — Z79.899 CONTROLLED SUBSTANCE AGREEMENT SIGNED: ICD-10-CM

## 2020-09-16 DIAGNOSIS — F90.2 ATTENTION DEFICIT HYPERACTIVITY DISORDER (ADHD), COMBINED TYPE: ICD-10-CM

## 2020-09-16 PROCEDURE — 96127 BRIEF EMOTIONAL/BEHAV ASSMT: CPT | Mod: TEL | Performed by: PHYSICIAN ASSISTANT

## 2020-09-16 PROCEDURE — 99214 OFFICE O/P EST MOD 30 MIN: CPT | Mod: TEL | Performed by: PHYSICIAN ASSISTANT

## 2020-09-16 RX ORDER — DEXTROAMPHETAMINE SACCHARATE, AMPHETAMINE ASPARTATE, DEXTROAMPHETAMINE SULFATE AND AMPHETAMINE SULFATE 1.25; 1.25; 1.25; 1.25 MG/1; MG/1; MG/1; MG/1
TABLET ORAL
Qty: 120 TABLET | Refills: 0 | Status: SHIPPED | OUTPATIENT
Start: 2020-09-16 | End: 2020-11-11

## 2020-09-16 ASSESSMENT — ANXIETY QUESTIONNAIRES
7. FEELING AFRAID AS IF SOMETHING AWFUL MIGHT HAPPEN: NOT AT ALL
GAD7 TOTAL SCORE: 0
3. WORRYING TOO MUCH ABOUT DIFFERENT THINGS: NOT AT ALL
6. BECOMING EASILY ANNOYED OR IRRITABLE: NOT AT ALL
5. BEING SO RESTLESS THAT IT IS HARD TO SIT STILL: NOT AT ALL
IF YOU CHECKED OFF ANY PROBLEMS ON THIS QUESTIONNAIRE, HOW DIFFICULT HAVE THESE PROBLEMS MADE IT FOR YOU TO DO YOUR WORK, TAKE CARE OF THINGS AT HOME, OR GET ALONG WITH OTHER PEOPLE: NOT DIFFICULT AT ALL
1. FEELING NERVOUS, ANXIOUS, OR ON EDGE: NOT AT ALL
2. NOT BEING ABLE TO STOP OR CONTROL WORRYING: NOT AT ALL

## 2020-09-16 ASSESSMENT — PATIENT HEALTH QUESTIONNAIRE - PHQ9
5. POOR APPETITE OR OVEREATING: NOT AT ALL
SUM OF ALL RESPONSES TO PHQ QUESTIONS 1-9: 0

## 2020-09-16 NOTE — PROGRESS NOTES
"Richard Loza is a 50 year old male who is being evaluated via a billable telephone visit.      The patient has been notified of following:     \"This telephone visit will be conducted via a call between you and your physician/provider. We have found that certain health care needs can be provided without the need for a physical exam.  This service lets us provide the care you need with a short phone conversation.  If a prescription is necessary we can send it directly to your pharmacy.  If lab work is needed we can place an order for that and you can then stop by our lab to have the test done at a later time.    Telephone visits are billed at different rates depending on your insurance coverage. During this emergency period, for some insurers they may be billed the same as an in-person visit.  Please reach out to your insurance provider with any questions.    If during the course of the call the physician/provider feels a telephone visit is not appropriate, you will not be charged for this service.\"    Patient has given verbal consent for Telephone visit?  Yes    What phone number would you like to be contacted at? [phone]    How would you like to obtain your AVS? MyChart    Subjective     Richard Loza is a 50 year old male who presents via phone visit today for the following health issues:    HPI    Richard Loza is a 50 year old male who presents today via telephone visit for the following health issues:    HPI    Anxiety Follow-Up  Sertraline 50 mg once daily    How are you doing with your anxiety since your last visit? Improved     Are you having other symptoms that might be associated with anxiety? No    Have you had a significant life event? No     Are you feeling depressed? No    Do you have any concerns with your use of alcohol or other drugs? No    Social History     Tobacco Use     Smoking status: Never Smoker     Smokeless tobacco: Never Used   Substance Use Topics     Alcohol use: Yes     " Alcohol/week: 0.0 - 0.8 standard drinks     Comment: 1 drink/week     Drug use: No     JANAK-7 SCORE 11/6/2019 2/5/2020 9/16/2020   Total Score - - -   Total Score 0 0 0     PHQ 11/6/2019 2/5/2020 9/16/2020   PHQ-9 Total Score 0 0 0   Q9: Thoughts of better off dead/self-harm past 2 weeks Not at all Not at all Not at all         Medication Followup of Adderall     Taking Medication as prescribed: yes    Side Effects:  None    Medication Helping Symptoms:  yes       Review of Systems   Constitutional, HEENT, cardiovascular, pulmonary, GI, , musculoskeletal, neuro, skin, endocrine and psych systems are negative, except as otherwise noted.       Objective          Vitals:  No vitals were obtained today due to virtual visit.    healthy, alert and no distress  PSYCH: Alert and oriented times 3; coherent speech, normal   rate and volume, able to articulate logical thoughts, able   to abstract reason, no tangential thoughts, no hallucinations   or delusions  His affect is normal  RESP: No cough, no audible wheezing, able to talk in full sentences  Remainder of exam unable to be completed due to telephone visits            Assessment/Plan:    Assessment & Plan     Diagnoses and all orders for this visit:    Attention deficit hyperactivity disorder (ADHD), combined type  Controlled substance agreement signed - 6/27/19 - Adderall 5 mg #120 monthly, q6 month OV  Well controlled.  Continue current regimen.  -     amphetamine-dextroamphetamine (ADDERALL) 5 MG tablet; TAKE 2 TABLETS BY MOUTH EVERY MORNING, 1 TABLET DAILY WITH LUNCH AND 1 TABLET DAILY IN THE AFTERNOON.+    Anxiety state  Well controlled.  Continue current regimen.  -     sertraline (ZOLOFT) 50 MG tablet; Take 1 tablet (50 mg) by mouth daily               Return in about 6 months (around 3/16/2021) for Physical Exam, Lab Work.    Christine Payne PA-C  [facility]    Phone call duration:  6:59 minutes

## 2020-09-17 ASSESSMENT — ANXIETY QUESTIONNAIRES: GAD7 TOTAL SCORE: 0

## 2020-11-10 DIAGNOSIS — F90.2 ATTENTION DEFICIT HYPERACTIVITY DISORDER (ADHD), COMBINED TYPE: ICD-10-CM

## 2020-11-10 DIAGNOSIS — Z79.899 CONTROLLED SUBSTANCE AGREEMENT SIGNED: ICD-10-CM

## 2020-11-11 RX ORDER — DEXTROAMPHETAMINE SACCHARATE, AMPHETAMINE ASPARTATE, DEXTROAMPHETAMINE SULFATE AND AMPHETAMINE SULFATE 1.25; 1.25; 1.25; 1.25 MG/1; MG/1; MG/1; MG/1
TABLET ORAL
Qty: 120 TABLET | Refills: 0 | Status: SHIPPED | OUTPATIENT
Start: 2020-11-11 | End: 2021-01-14

## 2020-11-11 NOTE — TELEPHONE ENCOUNTER
Controlled Substance Refill Request for amphetamine-dextroamphetamine (ADDERALL) 5 MG tablet  Problem List Complete:    Yes    Last Written Prescription Date:  9-16-20  Last Fill Quantity: 120 tablet,   # refills: 0      [place] Office Visit [place] [facility] [place] provider: 9-16-20    Future Office visit:     Controlled substance agreement:   Encounter-Level CSA - 10/31/2018:    Controlled Substance Agreement - Scan on 11/5/2018 11:19 AM: CONTROLLED SUBSTANCE AGREEMENT     Encounter-Level CSA - 10/09/2017:    Controlled Substance Agreement - Scan on 10/17/2017 12:09 PM: CONTROLLED SUBSTANCE AGREEMENT     Encounter-Level CSA - 09/25/2017:    Controlled Substance Agreement - Scan on 10/3/2017  1:58 PM: CONTROLLED SUBSTANCE AGREEMENT     Patient-Level CSA:    Controlled Substance Agreement - Non - Opioid - Scan on 7/11/2019  6:56 AM: NON-OPIOID CONTROLLED SUBSTANCE AGREEMENT         Last Urine Drug Screen:   Pain Drug SCR UR W RPTD Meds   Date Value Ref Range Status   11/06/2019 FINAL  Final     Comment:     (Note)  ====================================================================  TOXAS[facility]RE COMP DRUG ANALYSIS,UR  ====================================================================  Test                             Result       Flag       Units        Drug Present and Declared for Prescription Verification   Amphetamine                    459          EXPECTED   ng/mg creat    Amphetamine is available as a schedule II prescription drug.  Drug Present not Declared for Prescription Verification   Sertraline                     PRESENT      UNEXPECTED               Desmethylsertraline            PRESENT      UNEXPECTED                Desmethylsertraline is an expected metabolite of sertraline.  ====================================================================  Test                      Result    Flag   Units      Ref Range        Creatinine              140              mg/dL      >=20             ====================================================================  Declared Medications:  The flagging and interpretation on this report are based on the  following declared medications.  Unexpected results may arise from  inaccuracies in the declared medications.  **Note: The testing scope of this panel includes these medications:  Amphetamine  Amphetamine (Adderall)  ====================================================================  For clinical consultation, please call [phone].  ====================================================================  Analysis performed by [employer] [place], [place] [id number] [place] [address] Drug Analysis UR   Date Value Ref Range Status   10/09/2017 FINAL  Final     Comment:     (Note)  ====================================================================  COMPREHENSIVE DRUG ANALYSIS,UR  ====================================================================  Test                             Result       Flag       Units        Drug Present   Amphetamine                    2400                    ng/mg creat    Amphetamine is available as a schedule II prescription drug.   Sertraline                     PRESENT                               Desmethylsertraline            PRESENT                                Desmethylsertraline is an expected metabolite of sertraline.  ====================================================================  Test                      Result    Flag   Units      Ref Range        Creatinine              226              mg/dL      >=20            ====================================================================  For clinical consultation, please call [phone].  ====================================================================  Analysis performed by [employer] [place] [address]     , No results found for: THC13, PCP13, COC13, MAMP13, OPI13, AMP13, BZO13, TCA13, MTD13, BAR13, OXY13, PPX13, BUP13      Processing:  Fax Rx to listed pharmacy    [email or web address]   checked in past 3 months?  No, route to RN

## 2021-01-12 DIAGNOSIS — F90.2 ATTENTION DEFICIT HYPERACTIVITY DISORDER (ADHD), COMBINED TYPE: Primary | ICD-10-CM

## 2021-01-12 DIAGNOSIS — Z79.899 CONTROLLED SUBSTANCE AGREEMENT SIGNED: ICD-10-CM

## 2021-01-13 NOTE — TELEPHONE ENCOUNTER
Controlled Substance Refill Request for amphetamine-dextroamphetamine (ADDERALL) 5 MG tablet  Problem List Complete:    Yes    Last Written Prescription Date:  11-11-20  Last Fill Quantity: 120 tablet,   # refills: 0      [place] Office Visit with [facility] [place]: 9-16-20    Future Office visit:     Controlled substance agreement:   Encounter-Level CSA - 10/31/2018:    Controlled Substance Agreement - Scan on 11/5/2018 11:19 AM: CONTROLLED SUBSTANCE AGREEMENT     Encounter-Level CSA - 10/09/2017:    Controlled Substance Agreement - Scan on 10/17/2017 12:09 PM: CONTROLLED SUBSTANCE AGREEMENT     Encounter-Level CSA - 09/25/2017:    Controlled Substance Agreement - Scan on 10/3/2017  1:58 PM: CONTROLLED SUBSTANCE AGREEMENT     Patient-Level CSA:    Controlled Substance Agreement - Non - Opioid - Scan on 7/11/2019  6:56 AM: NON-OPIOID CONTROLLED SUBSTANCE AGREEMENT         Last Urine Drug Screen:   Pain Drug SCR UR W RPTD Meds   Date Value Ref Range Status   11/06/2019 FINAL  Final     Comment:     (Note)  ====================================================================  [facility] COMP DRUG ANALYSIS,UR  ====================================================================  Test                             Result       Flag       Units        Drug Present and Declared for Prescription Verification   Amphetamine                    459          EXPECTED   ng/mg creat    Amphetamine is available as a schedule II prescription drug.  Drug Present not Declared for Prescription Verification   Sertraline                     PRESENT      UNEXPECTED               Desmethylsertraline            PRESENT      UNEXPECTED                Desmethylsertraline is an expected metabolite of sertraline.  ====================================================================  Test                      Result    Flag   Units      Ref Range        Creatinine              140              mg/dL      >=20             ====================================================================  Declared Medications:  The flagging and interpretation on this report are based on the  following declared medications.  Unexpected results may arise from  inaccuracies in the declared medications.  **Note: The testing scope of this panel includes these medications:  Amphetamine  Amphetamine (Adderall)  ====================================================================  For clinical consultation, please call [phone].  ====================================================================  Analysis performed by [facility], [employer] [place], [place] [id number] [place] [address] Drug Analysis UR   Date Value Ref Range Status   10/09/2017 FINAL  Final     Comment:     (Note)  ====================================================================  COMPREHENSIVE DRUG ANALYSIS,UR  ====================================================================  Test                             Result       Flag       Units        Drug Present   Amphetamine                    2400                    ng/mg creat    Amphetamine is available as a schedule II prescription drug.   Sertraline                     PRESENT                               Desmethylsertraline            PRESENT                                Desmethylsertraline is an expected metabolite of sertraline.  ====================================================================  Test                      Result    Flag   Units      Ref Range        Creatinine              226              mg/dL      >=20            ====================================================================  For clinical consultation, please call [phone].  ====================================================================  Analysis performed by [facility] [place] [address]     , No results found for: THC13, PCP13, COC13, MAMP13, OPI13, AMP13, BZO13, TCA13, MTD13, BAR13, OXY13, PPX13, BUP13      Processing:  Fax Rx to listed pharmacy    [email or web address]   checked in past 3 months?  No, route to RN

## 2021-01-14 ENCOUNTER — HEALTH MAINTENANCE LETTER (OUTPATIENT)
Age: 51
End: 2021-01-14

## 2021-01-14 RX ORDER — DEXTROAMPHETAMINE SACCHARATE, AMPHETAMINE ASPARTATE, DEXTROAMPHETAMINE SULFATE AND AMPHETAMINE SULFATE 1.25; 1.25; 1.25; 1.25 MG/1; MG/1; MG/1; MG/1
TABLET ORAL
Qty: 120 TABLET | Refills: 0 | Status: SHIPPED | OUTPATIENT
Start: 2021-01-14 | End: 2021-03-01

## 2021-02-03 DIAGNOSIS — F41.1 ANXIETY STATE: ICD-10-CM

## 2021-02-26 DIAGNOSIS — Z79.899 CONTROLLED SUBSTANCE AGREEMENT SIGNED: ICD-10-CM

## 2021-02-26 DIAGNOSIS — F90.2 ATTENTION DEFICIT HYPERACTIVITY DISORDER (ADHD), COMBINED TYPE: ICD-10-CM

## 2021-03-01 RX ORDER — DEXTROAMPHETAMINE SACCHARATE, AMPHETAMINE ASPARTATE, DEXTROAMPHETAMINE SULFATE AND AMPHETAMINE SULFATE 1.25; 1.25; 1.25; 1.25 MG/1; MG/1; MG/1; MG/1
TABLET ORAL
Qty: 120 TABLET | Refills: 0 | Status: SHIPPED | OUTPATIENT
Start: 2021-03-01 | End: 2021-04-21

## 2021-03-01 NOTE — TELEPHONE ENCOUNTER
Outpatient Medication Detail   Disp Refills Start End LORENZO   amphetamine-dextroamphetamine (ADDERALL) 5 MG tablet 120 tablet 0 1/14/2021  No   Sig: TAKE TWO TABLETS BY MOUTH EVERY MORNING AND ONE TABLET WITH LUNCH AND ONE TABLET EVERY EVENING     Problem List Complete:    Yes    Last Office Visit with [facility] primary care provider: 9/16/2020    Future Office visit:     Controlled substance agreement:   Encounter-Level CSA - 10/31/2018:    Controlled Substance Agreement - Scan on 11/5/2018 11:19 AM: CONTROLLED SUBSTANCE AGREEMENT     Encounter-Level CSA - 10/09/2017:    Controlled Substance Agreement - Scan on 10/17/2017 12:09 PM: CONTROLLED SUBSTANCE AGREEMENT     Encounter-Level CSA - 09/25/2017:    Controlled Substance Agreement - Scan on 10/3/2017  1:58 PM: CONTROLLED SUBSTANCE AGREEMENT     Patient-Level CSA:    Controlled Substance Agreement - Non - Opioid - Scan on 7/11/2019  6:56 AM: NON-OPIOID CONTROLLED SUBSTANCE AGREEMENT         Last Urine Drug Screen:   Pain Drug SCR UR W RPTD Meds   Date Value Ref Range Status   11/06/2019 FINAL  Final     Comment:     (Note)  ====================================================================  [facility] COMP DRUG ANALYSIS,UR  ====================================================================  Test                             Result       Flag       Units        Drug Present and Declared for Prescription Verification   Amphetamine                    459          EXPECTED   ng/mg creat    Amphetamine is available as a schedule II prescription drug.  Drug Present not Declared for Prescription Verification   Sertraline                     PRESENT      UNEXPECTED               Desmethylsertraline            PRESENT      UNEXPECTED                Desmethylsertraline is an expected metabolite of sertraline.  ====================================================================  Test                      Result    Flag   Units      Ref Range        Creatinine              140               mg/dL      >=20            ====================================================================  Declared Medications:  The flagging and interpretation on this report are based on the  following declared medications.  Unexpected results may arise from  inaccuracies in the declared medications.  **Note: The testing scope of this panel includes these medications:  Amphetamine  Amphetamine (Adderall)  ====================================================================  For clinical consultation, please call [phone].  ====================================================================  Analysis performed by [employer] [place] [address]     [facility] Drug Analysis UR   Date Value Ref Range Status   10/09/2017 FINAL  Final     Comment:     (Note)  ====================================================================  COMPREHENSIVE DRUG ANALYSIS,UR  ====================================================================  Test                             Result       Flag       Units        Drug Present   Amphetamine                    2400                    ng/mg creat    Amphetamine is available as a schedule II prescription drug.   Sertraline                     PRESENT                               Desmethylsertraline            PRESENT                                Desmethylsertraline is an expected metabolite of sertraline.  ====================================================================  Test                      Result    Flag   Units      Ref Range        Creatinine              226              mg/dL      >=20            ====================================================================  For clinical consultation, please call [phone].  ====================================================================  Analysis performed by [facility] [place] [address]     , No results found for: THC13, PCP13, COC13, MAMP13, OPI13, AMP13, BZO13, TCA13, MTD13,  BAR13, OXY13, PPX13, BUP13     Processing:  Rx to be electronically transmitted to pharmacy by provider     [email or web address]      Routing refill request to provider for review/approval because:  Drug not on the [facility] refill protocol         Sharon Webster RN  [facility]

## 2021-03-01 NOTE — TELEPHONE ENCOUNTER
OK for fill.   Please advise OV required for further fills (due for labs) - prefer fasting PX      Christine Payne MBA, MS, PA-C

## 2021-04-20 NOTE — PROGRESS NOTES
SUBJECTIVE:   CC: Richard Loza is an 51 year old male who presents for preventative health visit.       Patient has been advised of split billing requirements and indicates understanding: Yes  Healthy Habits:     Getting at least 3 servings of Calcium per day:  Yes    Bi-annual eye exam:  NO    Dental care twice a year:  Yes    Sleep apnea or symptoms of sleep apnea:  None    Diet:  Regular (no restrictions)    Frequency of exercise:  2-3 days/week    Duration of exercise:  30-45 minutes    Taking medications regularly:  Yes    Medication side effects:  Not applicable and None    PHQ-2 Total Score: 0    Additional concerns today:  No      Anxiety Follow-Up  Taking sertraline 50 mg once daily.  Reports this is working well.    How are you doing with your anxiety since your last visit? Stable    Are you having other symptoms that might be associated with anxiety? No    Have you had a significant life event? No     Are you feeling depressed? No    Do you have any concerns with your use of alcohol or other drugs? No    Social History     Tobacco Use     Smoking status: Never Smoker     Smokeless tobacco: Never Used   Substance Use Topics     Alcohol use: Yes     Alcohol/week: 0.0 - 0.8 standard drinks     Comment: 1 drink/week     Drug use: No     JANAK-7 SCORE 2/5/2020 9/16/2020 4/21/2021   Total Score - - -   Total Score 0 0 0     PHQ 2/5/2020 9/16/2020 4/21/2021   PHQ-9 Total Score 0 0 0   Q9: Thoughts of better off dead/self-harm past 2 weeks Not at all Not at all Not at all       ADHD   Adderall 5 mg tablets.  Taking 2 tablets in the morning, 1 tablet at lunchtime and 1 tablet early afternoon.  Working well.  Occasional heart racing, however, associates this with caffeine intake.    Taking Medication as prescribed: yes    Side Effects: Occasional heart racing    Medication Helping Symptoms:  Yes    Hyperlipidemia Follow-Up  On Niacin & aspirin  OTC fish oil (insurance didn't cover Lovaza).  Dad has high  triglycerides too. Today patients lipid panel have improved.    Recent Labs   Lab Test 11/06/19  1039 07/23/18  1009   CHOL 182 161   HDL 47 35*   * Cannot estimate LDL when triglyceride exceeds 400 mg/dL  85   TRIG 141 602*       Crohns   7/2010 status post ileocolic resection.  [place] with Dr. Lopez every 6 months with [facility].  Reports his last colonoscopy was in 2016 and is due for repeat this year.  Is on Humira every 2 weeks and is doing well.  No exacerbations.  Has not yet been vaccinated against COVID-19.    Incisional hernia  Patient has uncomfortable sensation at the top of his incision especially when doing anything that causes intra-abdominal strain/pressure.  Typically noticed with exercise.  Is worried that it is going to worsen significantly.  Can reduce this on his own.  Interested in consultation with general surgery.    Skin tags  Patient was wondering about consultation with dermatology to discuss skin tags and some rashes that have recurred.  This is typically a reaction from Humira.      Today's PHQ-2 Score:   PHQ-2 ( 1999 Pfizer) 4/21/2021   Q1: Little interest or pleasure in doing things 0   Q2: Feeling down, depressed or hopeless 0   PHQ-2 Score 0   Q1: Little interest or pleasure in doing things -   Q2: Feeling down, depressed or hopeless -   PHQ-2 Score -       Abuse: Current or Past(Physical, Sexual or Emotional)- No  Do you feel safe in your environment? Yes    Have you ever done Advance Care Planning? (For example, a Health Directive, POLST, or a discussion with a medical provider or your loved ones about your wishes): No, advance care planning information given to patient to review.  Advanced care planning was discussed at today's visit.    Social History     Tobacco Use     Smoking status: Never Smoker     Smokeless tobacco: Never Used   Substance Use Topics     Alcohol use: Yes     Alcohol/week: 0.0 - 0.8 standard drinks     Comment: 1  drink/week     If you drink alcohol do you typically have >3 drinks per day or >7 drinks per week? No    Alcohol Use 4/21/2021   Prescreen: >3 drinks/day or >7 drinks/week? -   Prescreen: >3 drinks/day or >7 drinks/week? No       Last PSA:   PSA   Date Value Ref Range Status   11/06/2019 0.52 0 - 4 ug/L Final     Comment:     Assay Method:  Chemiluminescence using Siemens Vista analyzer       Reviewed orders with patient. Reviewed health maintenance and updated orders accordingly - Yes  BP Readings from Last 3 Encounters:   04/21/21 124/88   02/05/20 134/80   11/06/19 122/80    Wt Readings from Last 3 Encounters:   04/21/21 89.4 kg (197 lb)   02/05/20 88.5 kg (195 lb)   11/06/19 88.5 kg (195 lb)                  Patient Active Problem List   Diagnosis     Family history of diabetes mellitus     Anxiety state     Seasonal allergies     Attention deficit hyperactivity disorder (ADHD), combined type     Immunosuppressed status (H)- secondary to Humira injections every other week      Elevated triglycerides with high cholesterol     Controlled substance agreement signed - 4/21/2021 - Adderall 5 mg #120 monthly, q6 month OV     Crohns disease (H) - crohn's - ileocolonic; followed by Dr. Lopez every 6 months     Past Surgical History:   Procedure Laterality Date     ABDOMEN SURGERY  2008     GI SURGERY  2008     HC COLONOSCOPY THRU STOMA, DIAGNOSTIC  9/2007, 11/11, 8/16    colitis - repeat in 5 years     HERNIA REPAIR, INGUINAL RT/LT  8/07    right inguinal     HERNIORRHAPHY INCISIONAL (LOCATION)  12/10    incarcerated incisional hernia repair     SURGICAL HISTORY OF -   7/10    ileocolic resection due to Crohns disease     SURGICAL HISTORY OF -   1994    right ACL reconstruction       Social History     Tobacco Use     Smoking status: Never Smoker     Smokeless tobacco: Never Used   Substance Use Topics     Alcohol use: Yes     Alcohol/week: 0.0 - 0.8 standard drinks     Comment: 1 drink/week     Family History    Problem Relation Age of Onset     Depression Father      Attention Deficit Disorder Father      Hyperlipidemia Father         elevated triglycerides     Depression Brother      Attention Deficit Disorder Brother      Diabetes Paternal Grandmother      Arthritis Paternal Grandmother      Cancer Maternal Grandmother         lymphoma     Prostate Cancer No family hx of      Colon Cancer No family hx of          Current Outpatient Medications   Medication Sig Dispense Refill     amphetamine-dextroamphetamine (ADDERALL) 5 MG tablet TAKE TWO TABLETS BY MOUTH EVERY MORNING AND ONE TABLET WITH LUNCH AND ONE TABLET EVERY EVENING 120 tablet 0     HUMIRA PEN 40 MG/0.8ML SC KIT   0     niacin (SLO-NIACIN) 500 MG CR tablet Take 1 tablet (500 mg) by mouth At Bedtime 90 tablet 0     Omega-3 Fish Oil 500 MG capsule Take 4 capsules (2,000 mg) by mouth 2 times daily       sertraline (ZOLOFT) 50 MG tablet Take 1 tablet (50 mg) by mouth daily 90 tablet 1       Reviewed and updated as needed this visit by clinical staff  Tobacco  Allergies  Meds  Problems  Med Hx  Surg Hx  Fam Hx  Soc Hx          Reviewed and updated as needed this visit by Provider  Tobacco  Allergies  Meds  Problems  Med Hx  Surg Hx  Fam Hx             Review of Systems  CONSTITUTIONAL: NEGATIVE for fever, chills, change in weight  INTEGUMENTARY/SKIN: NEGATIVE for worrisome rashes, moles or lesions  EYES: NEGATIVE for vision changes or irritation  ENT: NEGATIVE for ear, mouth and throat problems  RESP: NEGATIVE for significant cough or SOB  CV: NEGATIVE for chest pain, palpitations or peripheral edema  GI: NEGATIVE for nausea, abdominal pain, heartburn, or change in bowel habits   male: negative for dysuria, hematuria, decreased urinary stream, erectile dysfunction, urethral discharge  MUSCULOSKELETAL: NEGATIVE for significant arthralgias or myalgia  NEURO: NEGATIVE for weakness, dizziness or paresthesias  PSYCHIATRIC: NEGATIVE for changes in mood or  "affect    OBJECTIVE:   /88 (BP Location: Right arm, Patient Position: Chair, Cuff Size: Adult Large)   Pulse 94   Temp 96.8  F (36  C) (Tympanic)   Ht 1.765 m (5' 9.5\")   Wt 89.4 kg (197 lb)   SpO2 98%   BMI 28.67 kg/m      Physical Exam  GENERAL: healthy, alert and no distress  EYES: Eyes grossly normal to inspection, PERRL and conjunctivae and sclerae normal  HENT: ear canals and TM's normal, nose and mouth without ulcers or lesions  NECK: no adenopathy, no asymmetry, masses, or scars and thyroid normal to palpation  RESP: lungs clear to auscultation - no rales, rhonchi or wheezes  CV: regular rate and rhythm, normal S1 S2, no S3 or S4, no murmur, click or rub, no peripheral edema and peripheral pulses strong  ABDOMEN: soft, nontender, no hepatosplenomegaly, no masses and bowel sounds normal, reducible hernia at the top of his abdominal incision, nontender.  MS: no gross musculoskeletal defects noted, no edema  SKIN: no suspicious lesions or rashes  NEURO: Normal strength and tone, mentation intact and speech normal  PSYCH: mentation appears normal, affect normal/bright    Diagnostic Test Results:  Labs reviewed in Epic    ASSESSMENT/PLAN:   Richard was seen today for physical.    Diagnoses and all orders for this visit:    Routine general medical examination at a health care facility  -     REVIEW OF HEALTH MAINTENANCE PROTOCOL ORDERS    Crohns disease (H) - crohn's - ileocolonic; followed by Dr. Lopez every 6 months  Immunosuppressed status (H)- secondary to Humira injections every other week   Continue to follow with gastroenterology as scheduled.  Colonoscopy is planned.  Recommend COVID-19 vaccine and will ask our staff to reach out once schedules are released for new appointments.  -     Comprehensive metabolic panel (BMP + Alb, Alk Phos, ALT, AST, Total. Bili, TP); Future    Attention deficit hyperactivity disorder (ADHD), combined type  Controlled substance agreement signed - 4/21/2021 - " "Adderall 5 mg #120 monthly, q6 month OV  Controlled substance agreement resigned today.  Continue with current regimen.  Med check in 6 months.  -     amphetamine-dextroamphetamine (ADDERALL) 5 MG tablet; TAKE TWO TABLETS BY MOUTH EVERY MORNING AND ONE TABLET WITH LUNCH AND ONE TABLET EVERY EVENING    Anxiety state  Well-controlled.  Continue current regimen.  -     sertraline (ZOLOFT) 50 MG tablet; Take 1 tablet (50 mg) by mouth daily    Incisional hernia, without obstruction or gangrene  Symptomatic.  Will have patient seek general surgery consultation to determine if watchful waiting is recommended or if any intervention would be advised.  -     GENERAL SURG ADULT REFERRAL; Future    Skin tag  Referral for full body skin check with dermatology  -     DERMATOLOGY ADULT REFERRAL; Future    Elevated triglycerides with high cholesterol  Continue supplementation and labs for surveillance.  -     Lipid panel reflex to direct LDL Fasting; Future    Need for hepatitis C screening test  Routine screening  -     Hepatitis C Screen Reflex to HCV RNA Quant and Genotype; Future    Screening for prostate cancer  Routine screening  -     PROSTATE SPEC ANTIGEN SCREEN; Future    Screening for deficiency anemia  Routine screening  -     CBC with platelets; Future        Patient has been advised of split billing requirements and indicates understanding: Yes  COUNSELING:   Reviewed preventive health counseling, as reflected in patient instructions       Regular exercise       Healthy diet/nutrition       Prostate cancer screening    Estimated body mass index is 28.67 kg/m  as calculated from the following:    Height as of this encounter: 1.765 m (5' 9.5\").    Weight as of this encounter: 89.4 kg (197 lb).     Weight management plan: Discussed healthy diet and exercise guidelines    He reports that he has never smoked. He has never used smokeless tobacco.      Counseling Resources:  ATP IV Guidelines  Pooled Cohorts Equation " Calculator  FRAX Risk Assessment  ICSI Preventive Guidelines  Dietary Guidelines for Americans, 2010  USDA's MyPlate  ASA Prophylaxis  Lung CA Screening    JUDIE Velazco [facility]

## 2021-04-21 ENCOUNTER — OFFICE VISIT (OUTPATIENT)
Dept: FAMILY MEDICINE | Facility: CLINIC | Age: 51
End: 2021-04-21
Payer: COMMERCIAL

## 2021-04-21 VITALS
HEIGHT: 70 IN | OXYGEN SATURATION: 98 % | DIASTOLIC BLOOD PRESSURE: 88 MMHG | SYSTOLIC BLOOD PRESSURE: 124 MMHG | WEIGHT: 197 LBS | TEMPERATURE: 96.8 F | HEART RATE: 94 BPM | BODY MASS INDEX: 28.2 KG/M2

## 2021-04-21 DIAGNOSIS — K50.80 CROHN'S DISEASE OF BOTH SMALL AND LARGE INTESTINE WITHOUT COMPLICATION (H): ICD-10-CM

## 2021-04-21 DIAGNOSIS — Z00.00 ROUTINE GENERAL MEDICAL EXAMINATION AT A HEALTH CARE FACILITY: Primary | ICD-10-CM

## 2021-04-21 DIAGNOSIS — F41.1 ANXIETY STATE: ICD-10-CM

## 2021-04-21 DIAGNOSIS — L91.8 SKIN TAG: ICD-10-CM

## 2021-04-21 DIAGNOSIS — F90.2 ATTENTION DEFICIT HYPERACTIVITY DISORDER (ADHD), COMBINED TYPE: ICD-10-CM

## 2021-04-21 DIAGNOSIS — E78.2 ELEVATED TRIGLYCERIDES WITH HIGH CHOLESTEROL: ICD-10-CM

## 2021-04-21 DIAGNOSIS — Z11.59 NEED FOR HEPATITIS C SCREENING TEST: ICD-10-CM

## 2021-04-21 DIAGNOSIS — D84.9 IMMUNOSUPPRESSED STATUS (H): ICD-10-CM

## 2021-04-21 DIAGNOSIS — Z13.0 SCREENING FOR DEFICIENCY ANEMIA: ICD-10-CM

## 2021-04-21 DIAGNOSIS — K43.2 INCISIONAL HERNIA, WITHOUT OBSTRUCTION OR GANGRENE: ICD-10-CM

## 2021-04-21 DIAGNOSIS — Z79.899 CONTROLLED SUBSTANCE AGREEMENT SIGNED: ICD-10-CM

## 2021-04-21 DIAGNOSIS — Z12.5 SCREENING FOR PROSTATE CANCER: ICD-10-CM

## 2021-04-21 PROCEDURE — 99214 OFFICE O/P EST MOD 30 MIN: CPT | Mod: 25 | Performed by: PHYSICIAN ASSISTANT

## 2021-04-21 PROCEDURE — 99396 PREV VISIT EST AGE 40-64: CPT | Performed by: PHYSICIAN ASSISTANT

## 2021-04-21 PROCEDURE — 96127 BRIEF EMOTIONAL/BEHAV ASSMT: CPT | Performed by: PHYSICIAN ASSISTANT

## 2021-04-21 RX ORDER — DEXTROAMPHETAMINE SACCHARATE, AMPHETAMINE ASPARTATE, DEXTROAMPHETAMINE SULFATE AND AMPHETAMINE SULFATE 1.25; 1.25; 1.25; 1.25 MG/1; MG/1; MG/1; MG/1
TABLET ORAL
Qty: 120 TABLET | Refills: 0 | Status: SHIPPED | OUTPATIENT
Start: 2021-04-21 | End: 2021-07-05

## 2021-04-21 ASSESSMENT — MIFFLIN-ST. JEOR: SCORE: 1751.9

## 2021-04-21 ASSESSMENT — ANXIETY QUESTIONNAIRES
6. BECOMING EASILY ANNOYED OR IRRITABLE: NOT AT ALL
2. NOT BEING ABLE TO STOP OR CONTROL WORRYING: NOT AT ALL
5. BEING SO RESTLESS THAT IT IS HARD TO SIT STILL: NOT AT ALL
7. FEELING AFRAID AS IF SOMETHING AWFUL MIGHT HAPPEN: NOT AT ALL
GAD7 TOTAL SCORE: 0
3. WORRYING TOO MUCH ABOUT DIFFERENT THINGS: NOT AT ALL
1. FEELING NERVOUS, ANXIOUS, OR ON EDGE: NOT AT ALL

## 2021-04-21 ASSESSMENT — PATIENT HEALTH QUESTIONNAIRE - PHQ9
5. POOR APPETITE OR OVEREATING: NOT AT ALL
SUM OF ALL RESPONSES TO PHQ QUESTIONS 1-9: 0

## 2021-04-21 NOTE — LETTER
[facility] [place]  04/21/21  Patient: Richard Loza  YOB: 1970  Medical Record Number: [id number]                                                                                  Non-Opioid Controlled Substance Agreement    This is an agreement between you and your provider regarding safe and appropriate controlled substance prescribing.? Controlled substances are?medicines that can cause physical and mental dependence. The manufacturing, possession and use of these medicines are regulated by law.  We here at [facility] are making a commitment to work with you in your efforts to get better.? To support you in this work, we will help you schedule regular appointments for medicine refills. If we must cancel or change your appointment for any reason, we will make sure you have enough medication to last until your next appointment.      As a Provider, I will:     Listen carefully to your concerns while treating you with dignity.     Recommend a treatment plan that I believe is in your best interest and may involve therapies other than medication.      Review the chance of benefit and the chance of harm of this medicine with you regularly and evaluate the safety and effectiveness of this therapy.       Provide a plan on how to discontinue if the decision is made to stop this medicine.       As a Patient, I understand controlled substances:       Are prescribed by my care provider to help me function or work and manage my condition(s).?    Are strong medicines and can cause serious side effects.       Need to be taken exactly as prescribed.?Combining controlled substances with certain medicines or chemicals (such as illegal drugs, alcohol, sedatives, sleeping pills, and benzodiazepines) can be dangerous or even fatal.? If I stop taking my medicines suddenly, I may have severe withdrawal symptoms.     The risks, benefits, and side effects of these medicine(s) were explained to me. I  agree that:    1. I will take part in other treatments as advised by my care team. This may be psychiatry or counseling, physical therapy, behavioral therapy, group treatment or a referral to specialist.    2. I will keep all my appointments and understand this is part of the monitoring of controlled substance.?My care team may require an office visit for EVERY controlled substance refill. If I miss appointments or don t follow instructions, my care team may stop my medicine    3. I will take my medicines as prescribed. I will not change the dose or schedule unless my care team tells me to. There will be no refills if I run out early.      4. I may be contactedwithout warning and asked to complete a urine drug test or pill count at any time. If I don t give a urine sample or participate in a pill count, the care team may stop my medicine.    5. I will only receive controlled substance prescriptions from this clinic. If treated by another provider, I will let them know I am taking controlled substances and that I have a treatment agreement with this provider. I will inform this care team within one business day if I am given a prescription for any controlled substance by another healthcare provider. This care team may contact other providers and pharmacists about my use of the medicines.     6. It is up to me to make sure that I do not run out of my medicines on weekends or holidays.?If my care team is willing to refill my prescription without a visit, I must request refills only during office hours. Refills may take up to 3 business days to process.  I will use one pharmacy to fill all my controlled substance prescriptions.  I will notify the clinic if any changes are made due to insurance changes or medication availability.    7. I am responsible for my prescriptions. If the medicine/prescription is lost, stolen or destroyed, it will not be replaced.?I also agree not to share controlled substance medicines with  anyone.     8. I am aware I should not use any illegal or recreational drugs. I agree not to drink alcohol unless my care team says I can.     9. If I enroll in the [place] Medical Cannabis program, I will tell my care team.?    10. I will tell my care team right away if I become pregnant, have a new medical problem treated outside of my regular clinic, or have a change in my medications.     11. I understand that this medicine can affect my thinking, judgment and reaction time.? Alcohol and drugs affect the brain and body, which can affect the safety of a person s driving. Being under the influence of alcohol or drugs can affect a person s decision-making, behaviors, personal safety, and the safety of others. Driving while impaired (DWI) can occur if a person is driving, operating, or in physical control of a car, motorcycle, boat, snowmobile, ATV, motorbike, off-road vehicle, or any other motor vehicle ([place] Statute 169A.20). I understand the risk if I choose to drive or operate machinery  I understand that if I do not follow any of the conditions above, my prescriptions or treatment may be stopped or changed.     I agree that my provider, clinic care team, and pharmacy may work with any city, state or federal law enforcement agency that investigates the misuse, sale, or other diversion of my controlled medicine. I will allow my provider to discuss my care with or share a copy of this agreement with any other treating provider, pharmacy or emergency room where I receive care.?    I have read this agreement and have asked questions about anything I did not understand.    ________________________________________________________  Patient Signature - Richard Loza     ___________________                   Date     ________________________________________________________  Provider Signature - Christine Payne PA-C       ___________________                   Date      ________________________________________________________  Witness Signature (required if provider not present while patient signing)          ___________________                   Date

## 2021-04-22 ENCOUNTER — TELEPHONE (OUTPATIENT)
Dept: FAMILY MEDICINE | Facility: CLINIC | Age: 51
End: 2021-04-22

## 2021-04-22 DIAGNOSIS — E78.2 ELEVATED TRIGLYCERIDES WITH HIGH CHOLESTEROL: ICD-10-CM

## 2021-04-22 DIAGNOSIS — Z12.5 SCREENING FOR PROSTATE CANCER: ICD-10-CM

## 2021-04-22 DIAGNOSIS — Z11.59 NEED FOR HEPATITIS C SCREENING TEST: ICD-10-CM

## 2021-04-22 DIAGNOSIS — Z13.0 SCREENING FOR DEFICIENCY ANEMIA: ICD-10-CM

## 2021-04-22 DIAGNOSIS — K50.80 CROHN'S DISEASE OF BOTH SMALL AND LARGE INTESTINE WITHOUT COMPLICATION (H): ICD-10-CM

## 2021-04-22 LAB
ERYTHROCYTE [DISTWIDTH] IN BLOOD BY AUTOMATED COUNT: 11.3 % (ref 10–15)
HCT VFR BLD AUTO: 46.5 % (ref 40–53)
HGB BLD-MCNC: 16.5 G/DL (ref 13.3–17.7)
MCH RBC QN AUTO: 32 PG (ref 26.5–33)
MCHC RBC AUTO-ENTMCNC: 35.5 G/DL (ref 31.5–36.5)
MCV RBC AUTO: 90 FL (ref 78–100)
PLATELET # BLD AUTO: 230 10E9/L (ref 150–450)
RBC # BLD AUTO: 5.15 10E12/L (ref 4.4–5.9)
WBC # BLD AUTO: 5.7 10E9/L (ref 4–11)

## 2021-04-22 PROCEDURE — 86803 HEPATITIS C AB TEST: CPT | Performed by: PHYSICIAN ASSISTANT

## 2021-04-22 PROCEDURE — G0103 PSA SCREENING: HCPCS | Performed by: PHYSICIAN ASSISTANT

## 2021-04-22 PROCEDURE — 83721 ASSAY OF BLOOD LIPOPROTEIN: CPT | Performed by: PHYSICIAN ASSISTANT

## 2021-04-22 PROCEDURE — 80053 COMPREHEN METABOLIC PANEL: CPT | Performed by: PHYSICIAN ASSISTANT

## 2021-04-22 PROCEDURE — 36415 COLL VENOUS BLD VENIPUNCTURE: CPT | Performed by: PHYSICIAN ASSISTANT

## 2021-04-22 PROCEDURE — 85027 COMPLETE CBC AUTOMATED: CPT | Performed by: PHYSICIAN ASSISTANT

## 2021-04-22 PROCEDURE — 80061 LIPID PANEL: CPT | Performed by: PHYSICIAN ASSISTANT

## 2021-04-22 ASSESSMENT — ANXIETY QUESTIONNAIRES: GAD7 TOTAL SCORE: 0

## 2021-04-23 ENCOUNTER — TELEPHONE (OUTPATIENT)
Dept: FAMILY MEDICINE | Facility: CLINIC | Age: 51
End: 2021-04-23

## 2021-04-23 LAB
ALBUMIN SERPL-MCNC: 3.9 G/DL (ref 3.4–5)
ALP SERPL-CCNC: 106 U/L (ref 40–150)
ALT SERPL W P-5'-P-CCNC: 57 U/L (ref 0–70)
ANION GAP SERPL CALCULATED.3IONS-SCNC: 6 MMOL/L (ref 3–14)
AST SERPL W P-5'-P-CCNC: 29 U/L (ref 0–45)
BILIRUB SERPL-MCNC: 0.7 MG/DL (ref 0.2–1.3)
BUN SERPL-MCNC: 14 MG/DL (ref 7–30)
CALCIUM SERPL-MCNC: 8.6 MG/DL (ref 8.5–10.1)
CHLORIDE SERPL-SCNC: 104 MMOL/L (ref 94–109)
CHOLEST SERPL-MCNC: 207 MG/DL
CO2 SERPL-SCNC: 26 MMOL/L (ref 20–32)
CREAT SERPL-MCNC: 1.07 MG/DL (ref 0.66–1.25)
GFR SERPL CREATININE-BSD FRML MDRD: 80 ML/MIN/{1.73_M2}
GLUCOSE SERPL-MCNC: 93 MG/DL (ref 70–99)
HCV AB SERPL QL IA: NONREACTIVE
HDLC SERPL-MCNC: 29 MG/DL
LDLC SERPL CALC-MCNC: ABNORMAL MG/DL
LDLC SERPL DIRECT ASSAY-MCNC: 80 MG/DL
NONHDLC SERPL-MCNC: 178 MG/DL
POTASSIUM SERPL-SCNC: 3.9 MMOL/L (ref 3.4–5.3)
PROT SERPL-MCNC: 7.9 G/DL (ref 6.8–8.8)
PSA SERPL-ACNC: 0.42 UG/L (ref 0–4)
SODIUM SERPL-SCNC: 136 MMOL/L (ref 133–144)
TRIGL SERPL-MCNC: 737 MG/DL

## 2021-04-23 RX ORDER — OMEGA-3-ACID ETHYL ESTERS 1 G/1
2 CAPSULE, LIQUID FILLED ORAL 2 TIMES DAILY
Qty: 360 CAPSULE | Refills: 3 | Status: SHIPPED | OUTPATIENT
Start: 2021-04-23 | End: 2024-04-30

## 2021-04-23 NOTE — RESULT ENCOUNTER NOTE
Triage: please advise patient of very high triglycerides and plan to restart Lovaza (RX fish oil), limit ETOH, increase exercise and recheck labs in 3 months with a fasting lab only.    Richard  I have reviewed your recent labs. Here are the results:    -Normal red blood cell (hgb) levels, normal white blood cell count and normal platelet levels.  -PSA (prostate specific antigen) test is normal.  This indicates a low likelihood of prostate cancer.  ADVISE: rechecking this in 1 year.  -Triglycerides are VERY elevated again and this not only increases your heart disease risk, but you are at a high risk for pancreatitis as well.  A diet high in fat and simple carbohydrates, alcohol use, genetics and being overweight can contribute to this.  Your LDL(bad) cholesterol and HDL(good) cholesterol levels are normal.  ADVISE: limit/stop alcohol use,  exercising 150 minutes of aerobic exercise per week (30 minutes 5 days per week or 50 minutes 3 days per week are options), weight control, and continuing your niacin.  I am going to send in the prescription fish oil again to see if this makes an improvement.  In 3 months, you should recheck your fasting cholesterol panel by scheduling a lab-only appointment.  -Liver and gallbladder tests are normal (ALT,AST, Alk phos, bilirubin), kidney function is normal (Cr, GFR), sodium is normal, potassium is normal, calcium is normal, glucose is normal.  -Hepatitis C antibody screen test shows no signs of a previous hepatitis C infection.     If you have any questions please do not hesitate to contact our office via phone ([phone]) or MyChart.    Christine Payne, MS, PA-C  [facility]

## 2021-04-23 NOTE — TELEPHONE ENCOUNTER
Reason for Call:  Form, our goal is to have forms completed with 72 hours, however, some forms may require a visit or additional information.    Type of letter, form or note:  medical - [place] for omega-3 acid ethyl esters (LOVAZA) 1 g capsule    Who is the form from?: [employer] (if other please explain)    Where did the form come from: form was faxed in    What clinic location was the form placed at?: [place]    Where the form was placed: Christine Payne PA-C Box/Folder    What number is listed as a contact on the form?: [phone]    Call taken on 4/23/2021 at 1:05 PM by Amanda VILLAR

## 2021-04-27 NOTE — TELEPHONE ENCOUNTER
PA Initiation    Medication: omega-3 acid ethyl esters (LOVAZA) 1 g capsule  Insurance Company: [employer] - Phone [phone] Fax [phone]  Pharmacy Filling the Rx: [facility] - [address]  Filling Pharmacy Phone: [phone]  Filling Pharmacy Fax: [phone]  Start Date: 4/27/2021

## 2021-04-27 NOTE — TELEPHONE ENCOUNTER
Prior Authorization Approval    Authorization Effective Date: 3/28/2021  Authorization Expiration Date: 4/26/2024  Medication: omega-3 acid ethyl esters (LOVAZA) 1 g capsule  Approved Dose/Quantity:   Reference #: [id number]   Insurance Company: [employer] - Phone [phone] Fax [phone]  Expected CoPay:       CoPay Card Available:      Foundation Assistance Needed:    Which Pharmacy is filling the prescription (Not needed for infusion/clinic administered): [facility] - [address]  Pharmacy Notified: Yes  Patient Notified: Yes  **Instructed pharmacy to notify patient when script is ready to /ship.**

## 2021-05-21 ENCOUNTER — TRANSFERRED RECORDS (OUTPATIENT)
Dept: HEALTH INFORMATION MANAGEMENT | Facility: CLINIC | Age: 51
End: 2021-05-21

## 2021-05-21 LAB
ALT SERPL-CCNC: 48 U/L (ref 0–78)
AST SERPL-CCNC: 26 U/L (ref 0–37)

## 2021-07-01 DIAGNOSIS — Z79.899 CONTROLLED SUBSTANCE AGREEMENT SIGNED: ICD-10-CM

## 2021-07-01 DIAGNOSIS — F90.2 ATTENTION DEFICIT HYPERACTIVITY DISORDER (ADHD), COMBINED TYPE: ICD-10-CM

## 2021-07-05 RX ORDER — DEXTROAMPHETAMINE SACCHARATE, AMPHETAMINE ASPARTATE, DEXTROAMPHETAMINE SULFATE AND AMPHETAMINE SULFATE 1.25; 1.25; 1.25; 1.25 MG/1; MG/1; MG/1; MG/1
TABLET ORAL
Qty: 120 TABLET | Refills: 0 | Status: SHIPPED | OUTPATIENT
Start: 2021-07-05 | End: 2022-03-14

## 2021-07-05 NOTE — TELEPHONE ENCOUNTER
amphetamine-dextroamphetamine (ADDERALL) 5 MG tablet 120 tablet 0 4/21/2021  No   Sig: TAKE TWO TABLETS BY MOUTH EVERY MORNING AND ONE TABLET WITH LUNCH AND ONE TABLET EVERY EVENING   Sent to pharmacy as: Amphetamine-Dextroamphetamine 5 MG Oral Tablet (ADDERALL)       Last office visit: 4/21/2021 with prescribing provider:     Future Office Visit:          Encounter-Level CSA - 10/31/2018:    Controlled Substance Agreement - Scan on 11/5/2018 11:19 AM: CONTROLLED SUBSTANCE AGREEMENT     Encounter-Level CSA - 10/09/2017:    Controlled Substance Agreement - Scan on 10/17/2017 12:09 PM: CONTROLLED SUBSTANCE AGREEMENT     Encounter-Level CSA - 09/25/2017:    Controlled Substance Agreement - Scan on 10/3/2017  1:58 PM: CONTROLLED SUBSTANCE AGREEMENT     Patient-Level CSA:    Controlled Substance Agreement - Non - Opioid - Scan on 4/22/2021  5:49 AM: NON-OPIOID CONTROLLED SUBSTANCE AGREEMENT  Controlled Substance Agreement - Non - Opioid - Scan on 7/11/2019  6:56 AM: NON-OPIOID CONTROLLED SUBSTANCE AGREEMENT           Routing refill request to provider for review/approval because:  Drug not on the [facility] refill protocol     Georgina Lovell RN, BSN  [facility]

## 2021-10-07 NOTE — TELEPHONE ENCOUNTER
Routing refill request to provider for review/approval because:  Drug not on the [facility] refill protocol   Amy Sinclair RN  [place] Triage           Detail Level: Zone

## 2021-10-18 DIAGNOSIS — F41.1 ANXIETY STATE: ICD-10-CM

## 2021-10-23 ENCOUNTER — HEALTH MAINTENANCE LETTER (OUTPATIENT)
Age: 51
End: 2021-10-23

## 2021-11-12 ENCOUNTER — TRANSFERRED RECORDS (OUTPATIENT)
Dept: HEALTH INFORMATION MANAGEMENT | Facility: CLINIC | Age: 51
End: 2021-11-12
Payer: COMMERCIAL

## 2022-03-11 DIAGNOSIS — Z79.899 CONTROLLED SUBSTANCE AGREEMENT SIGNED: ICD-10-CM

## 2022-03-11 DIAGNOSIS — F90.2 ATTENTION DEFICIT HYPERACTIVITY DISORDER (ADHD), COMBINED TYPE: ICD-10-CM

## 2022-03-14 RX ORDER — DEXTROAMPHETAMINE SACCHARATE, AMPHETAMINE ASPARTATE, DEXTROAMPHETAMINE SULFATE AND AMPHETAMINE SULFATE 1.25; 1.25; 1.25; 1.25 MG/1; MG/1; MG/1; MG/1
TABLET ORAL
Qty: 120 TABLET | Refills: 0 | Status: SHIPPED | OUTPATIENT
Start: 2022-03-14 | End: 2022-08-06

## 2022-03-14 NOTE — TELEPHONE ENCOUNTER
Pt due for annual fasting PX on/after 4/21/2022.  Should have been seen on/around 10/2021 due to controlled substance RX - however - since so close to annual appt time OK to do it all together.  Once appt made will send RX in to bridge to appt.      Christine Payne MBA, MS, PA-C

## 2022-03-14 NOTE — TELEPHONE ENCOUNTER
amphetamine-dextroamphetamine (ADDERALL) 5 MG tablet 120 tablet 0 7/5/2021  No   Sig: TAKE TWO TABLETS BY MOUTH EVERY MORNING AND ONE TABLET WITH LUNCH AND ONE TABLET EVERY EVENING   Sent to pharmacy as: Amphetamine-Dextroamphetamine 5 MG Oral Tablet (ADDERALL)       Last office visit: 4/21/2021     Future Office Visit:        CSA -- Patient Level:    Controlled Substance Agreement - Non - Opioid - Scan on 4/22/2021  5:49 AM: NON-OPIOID CONTROLLED SUBSTANCE AGREEMENT  Controlled Substance Agreement - Non - Opioid - Scan on 7/11/2019  6:56 AM: NON-OPIOID CONTROLLED SUBSTANCE AGREEMENT             Routing refill request to provider for review/approval because:  Drug not on the [facility] refill protocol     Georgina Lovell RN, BSN  [facility]

## 2022-05-16 NOTE — TELEPHONE ENCOUNTER
Medication Question or Refill  Who is calling: PT   What medication are you calling about (include dose and sig)?: amphetamine-dextroamphetamine (ADDERALL) 5 MG tablet  Controlled Substance Agreement on file: Yes:   Who prescribed the medication?:   Do you need a refill? Yes:   When did you use the medication last?   Patient offered an appointment? Yes:  Has appt on 9-16-20 will run out by then - est 10 pills left  Do you have any questions or concerns?  No  Requested Pharmacy: [facility]  Okay to leave a detailed message?: Yes at Cell number on file:    Telephone Information:   Mobile [phone]                      Glycopyrrolate Pregnancy And Lactation Text: This medication is Pregnancy Category B and is considered safe during pregnancy. It is unknown if it is excreted breast milk.

## 2022-06-04 ENCOUNTER — HEALTH MAINTENANCE LETTER (OUTPATIENT)
Age: 52
End: 2022-06-04

## 2022-08-05 DIAGNOSIS — F90.2 ATTENTION DEFICIT HYPERACTIVITY DISORDER (ADHD), COMBINED TYPE: ICD-10-CM

## 2022-08-05 DIAGNOSIS — Z79.899 CONTROLLED SUBSTANCE AGREEMENT SIGNED: ICD-10-CM

## 2022-08-05 NOTE — TELEPHONE ENCOUNTER
Routing refill request to provider for review/approval because:  Drug not on the [facility] refill protocol   amphetamine-dextroamphetamine (ADDERALL) 5 MG tablet 120 tablet 0 3/14/2022       LOV:  No show one month ago.  Future Appointments 8/5/2022 - 2/1/2023      Date Visit Type Length Department Provider     9/8/2022  1:40 PM OFFICE VISIT 40 min [facility] Christine Payne PA-C                 Next 5 appointments (look out 90 days)    Sep 08, 2022  1:40 PM  (Arrive by 1:20 PM)  Provider Visit with Christine Payne PA-C  [facility] ([facility] ) [address] [place] [address] [id number] [phone]          CSA -- Patient Level:    Controlled Substance Agreement - Non - Opioid - Scan on 4/22/2021  5:49 AM: NON-OPIOID CONTROLLED SUBSTANCE AGREEMENT  Controlled Substance Agreement - Non - Opioid - Scan on 7/11/2019  6:56 AM: NON-OPIOID CONTROLLED SUBSTANCE AGREEMENT       Clover SANTOS RN   [facility] Triage

## 2022-08-06 RX ORDER — DEXTROAMPHETAMINE SACCHARATE, AMPHETAMINE ASPARTATE, DEXTROAMPHETAMINE SULFATE AND AMPHETAMINE SULFATE 1.25; 1.25; 1.25; 1.25 MG/1; MG/1; MG/1; MG/1
TABLET ORAL
Qty: 120 TABLET | Refills: 0 | Status: SHIPPED | OUTPATIENT
Start: 2022-08-06 | End: 2022-09-29

## 2022-08-09 ENCOUNTER — HOSPITAL ENCOUNTER (EMERGENCY)
Facility: CLINIC | Age: 52
Discharge: HOME | End: 2022-08-09
Attending: EMERGENCY MEDICINE | Admitting: EMERGENCY MEDICINE
Payer: COMMERCIAL

## 2022-08-09 VITALS
OXYGEN SATURATION: 100 % | HEART RATE: 89 BPM | DIASTOLIC BLOOD PRESSURE: 60 MMHG | TEMPERATURE: 97.9 F | SYSTOLIC BLOOD PRESSURE: 110 MMHG | RESPIRATION RATE: 18 BRPM

## 2022-08-09 DIAGNOSIS — R45.851 SUICIDAL IDEATION: ICD-10-CM

## 2022-08-09 DIAGNOSIS — F43.21 GRIEF REACTION: ICD-10-CM

## 2022-08-09 LAB — ALCOHOL BREATH TEST: 0.12 (ref 0–0.01)

## 2022-08-09 PROCEDURE — 90791 PSYCH DIAGNOSTIC EVALUATION: CPT

## 2022-08-09 PROCEDURE — 99282 EMERGENCY DEPT VISIT SF MDM: CPT | Performed by: EMERGENCY MEDICINE

## 2022-08-09 PROCEDURE — 99285 EMERGENCY DEPT VISIT HI MDM: CPT | Mod: 25 | Performed by: EMERGENCY MEDICINE

## 2022-08-09 PROCEDURE — 82075 ASSAY OF BREATH ETHANOL: CPT | Performed by: EMERGENCY MEDICINE

## 2022-08-09 ASSESSMENT — ENCOUNTER SYMPTOMS
HEADACHES: 0
ABDOMINAL PAIN: 0
CHILLS: 0
SHORTNESS OF BREATH: 0
DYSPHORIC MOOD: 1
NECK PAIN: 0
DIFFICULTY URINATING: 0
VOMITING: 0
FEVER: 0
NERVOUS/ANXIOUS: 0
BACK PAIN: 0
NAUSEA: 0

## 2022-08-09 NOTE — ED PROVIDER NOTES
ED Provider Note  [facility]      History     Chief Complaint   Patient presents with     Suicidal     HPI  Richard Loza is a 52 year old male with history of Crohn's disease, no mental health history, presents to the ED for evaluation of suicidal thoughts.  Patient has never felt like this before.  He recently found out that his wife of 33 years filed for divorce and had been cheating on him.  He has been severely depressed.  Had thoughts of shooting himself or driving off a bridge.  Had participated in therapy which was provided by his job but did not feel that it was helping.  He is not sure what he needs but states that he is severely depressed and wants help.  Admits to alcohol use.  No drug use.  No medical complaints.  Has a stable job.    Past Medical History  Past Medical History:   Diagnosis Date     Depressive disorder 2001     High blood triglycerides     familial     Regional enteritis of small intestine (H) 07/2002    crohn's - ileocolonic; followed by Dr. Lopez     Seasonal allergies     AIT - 2011     Past Surgical History:   Procedure Laterality Date     ABDOMEN SURGERY  2008     GI SURGERY  2008     HERNIA REPAIR, INGUINAL RT/LT  8/07    right inguinal     HERNIORRHAPHY INCISIONAL (LOCATION)  12/10    incarcerated incisional hernia repair     SURGICAL HISTORY OF -   7/10    ileocolic resection due to Crohns disease     SURGICAL HISTORY OF -   1994    right ACL reconstruction     ZZ COLONOSCOPY THRU STOMA, DIAGNOSTIC  9/2007, 11/11, 8/16    colitis - repeat in 5 years     amphetamine-dextroamphetamine (ADDERALL) 5 MG tablet  sertraline (ZOLOFT) 50 MG tablet  HUMIRA PEN 40 MG/0.8ML SC KIT  niacin (SLO-NIACIN) 500 MG CR tablet  omega-3 acid ethyl esters (LOVAZA) 1 g capsule      Allergies   Allergen Reactions     Levaquin      Hurts to move     Family History  Family History   Problem Relation Age of Onset     Depression Father      Attention Deficit Disorder Father       Hyperlipidemia Father         elevated triglycerides     Depression Brother      Attention Deficit Disorder Brother      Diabetes Paternal Grandmother      Arthritis Paternal Grandmother      Cancer Maternal Grandmother         lymphoma     Prostate Cancer No family hx of      Colon Cancer No family hx of      Social History   Social History     Tobacco Use     Smoking status: Never Smoker     Smokeless tobacco: Never Used   Substance Use Topics     Alcohol use: Yes     Alcohol/week: 0.0 - 0.8 standard drinks     Comment: 1 drink/week     Drug use: No      Past medical history, past surgical history, medications, allergies, family history, and social history were reviewed with the patient. No additional pertinent items.       Review of Systems   Constitutional: Negative for chills and fever.   Respiratory: Negative for shortness of breath.    Cardiovascular: Negative for chest pain.   Gastrointestinal: Negative for abdominal pain, nausea and vomiting.   Genitourinary: Negative for difficulty urinating.   Musculoskeletal: Negative for back pain and neck pain.   Neurological: Negative for headaches.   Psychiatric/Behavioral: Positive for dysphoric mood and suicidal ideas. The patient is not nervous/anxious.      A complete review of systems was performed with pertinent positives and negatives noted in the HPI, and all other systems negative.    Physical Exam   BP: 106/67  Pulse: 91  Temp: 97.9  F (36.6  C)  Resp: 18  SpO2: 95 %  Physical Exam  Vitals and nursing note reviewed.   Constitutional:       General: He is not in acute distress.     Appearance: Normal appearance.   HENT:      Head: Normocephalic.      Nose: Nose normal.   Eyes:      Pupils: Pupils are equal, round, and reactive to light.   Cardiovascular:      Rate and Rhythm: Normal rate and regular rhythm.   Pulmonary:      Effort: Pulmonary effort is normal.   Abdominal:      General: There is no distension.   Musculoskeletal:         General: No  deformity. Normal range of motion.      Cervical back: Normal range of motion.   Skin:     General: Skin is warm.   Neurological:      Mental Status: He is alert and oriented to person, place, and time.   Psychiatric:         Attention and Perception: Attention normal.         Mood and Affect: Mood is depressed.         Speech: Speech normal.         Behavior: Behavior normal.         Thought Content: Thought content is not paranoid or delusional. Thought content includes suicidal ideation. Thought content does not include homicidal ideation. Thought content includes suicidal plan.         Cognition and Memory: Cognition normal.         Judgment: Judgment normal.         ED Course        Mental Health Risk Assessment      PSS-3    Date and Time Over the past 2 weeks have you felt down, depressed, or hopeless? Over the past 2 weeks have you had thoughts of killing yourself? Have you ever attempted to kill yourself? When did this last happen? User   08/09/22 0247 yes yes no -- JPA      C-SSRS ([place])    Date and Time Q1 Wished to be Dead (Past Month) Q2 Suicidal Thoughts (Past Month) Q3 Suicidal Thought Method Q4 Suicidal Intent without Specific Plan Q5 Suicide Intent with Specific Plan Q6 Suicide Behavior (Lifetime) Within the Past 3 Months? RETIRED: Level of Risk per Screen Screening Not Complete User   08/09/22 0830 no yes no no no no -- -- -- MM   08/09/22 0247 yes yes yes no no no -- -- -- JPA              Suicide assessment completed by mental health (D.E.C., LCSW, etc.)       Results for orders placed or performed during the hospital encounter of 08/09/22   Alcohol breath test POCT     Status: Abnormal   Result Value Ref Range    Alcohol Breath Test 0.115 (A) 0.00 - 0.01     Medications - No data to display     Assessments & Plan (with Medical Decision Making)   Patient presents to the ED with active suicidality.  Has tentative plan to shoot himself or jump off a bridge.  Is able to contract for safety while  in the ED.    Physical exam is without evidence of trauma or clinical toxidrome.    Normal vital signs.  Blood alcohol 0.115    Patient will need to undergo assessment by the mental health team.  We will sign out to morning provider to follow-up on the recommendations and disposition accordingly.      I have reviewed the nursing notes. I have reviewed the findings, diagnosis, plan and need for follow up with the patient.    Discharge Medication List as of 8/9/2022  8:43 AM          Final diagnoses:   Grief reaction   Suicidal ideation       --  Joseph Noyola DO  [facility] EMERGENCY DEPARTMENT  8/9/2022     Joseph Noyola DO  08/10/22 0021

## 2022-08-09 NOTE — ED NOTES
Bed: HW03  Expected date:   Expected time:   Means of arrival:   Comments:  . [facility] [address] 61 y/o M ETOH
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
## 2022-08-09 NOTE — DISCHARGE INSTRUCTIONS
Aftercare Plan  If I am feeling unsafe or I am in a crisis, I will:   Contact my established care providers   Call the National Suicide Prevention Lifeline: 988  Go to the nearest emergency room   Call 911     Warning signs that I or other people might notice when a crisis is developing for me: thoughts I would be better off dead, stuffing emotions, urges to drink alcohol to sooth emotions.    Things I am able to do on my own to cope or help me feel better: pray, reach out to others, read a book, go to a support group, meditation, journal.     Things that I am able to do with others to cope or help me better: Go to [employer]ism, work, [employer], share about my feelings.     Things I can use or do for distraction: Work, audio book, get involved in other groups, exercise, be with my supports.     Changes I can make to support my mental health and wellness: avoid alcohol especially when experiencing low moods. Alcohol impairs judgment.     People in my life that I can ask for help: My mom and my dad     Your [facility] has a mental health crisis team you can call 24/7: [facility] Crisis  [phone]    Other things that are important when I'm in crisis: I am not alone, I can give my source of pain to God, My children love and need me, I can set healthy boundaries because I love myself.     Additional resources and information: Follow up with therapist and lean on others for support. Avoid stuffing emotions, Be kind to yourself, Accept your emotions.        Crisis Lines  Crisis Text Line  Text 642685  You will be connected with a trained live crisis counselor to provide support.    Por espanol, texto  LAZARA a 426140 o texto a 442-AYUDAME en WhatsApp    [employer] (LGBTQ Youth Crisis Line)  4.851.839.5816  text START to 206-927      Community Resources  Fast Tracker  Linking people to mental health and substance use disorder resources  [employer]     [place] Mental Health Warm Line  Peer  "to peer support  Monday thru Saturday, 12 pm to 10 pm  167.759.5940 or 4.500.084.6507  Text \"Support\" to 55854    National [place] on Mental Illness (ROBERT)  486.053.3007 or 1.888.ROBERT.HELPS      Mental Health Apps  My3  https://Glacier Baypp.org/    VirtualHopeBox  https://[employer]/apps/virtual-hope-box/      Additional Information  Today you were seen by a licensed mental health professional through Triage and Transition services, [facility] ([facility])  for a crisis assessment in the Emergency Department at [facility].  It is recommended that you follow up with your established providers (psychiatrist, mental health therapist, and/or primary care doctor - as relevant) as soon as possible. Coordinators from [facility] will be calling you in the next 24-48 hours to ensure that you have the resources you need.  You can also contact [facility] coordinators directly at [phone]. You may have been scheduled for or offered an appointment with a mental health provider. [facility] maintains an extensive network of licensed behavioral health providers to connect patients with the services they need.  We do not charge providers a fee to participate in our referral network.  We match patients with providers based on a patient's specific needs, insurance coverage, and location.  Our first effort will be to refer you to a provider within your care system, and will utilize providers outside your care system as needed.        Please make an appointment to follow up with Your Primary Care Provider in 7-10 days for additional help.    "

## 2022-08-09 NOTE — ED NOTES
Patient was signed out to me by Dr. Noyola at shift change with DEC assessment and final disposition pending.  Please see his note for initial presentation and exam.  DEC assessment has been done and patient is feeling better at this time.  He no longer feels suicidal and feels that he has coping mechanisms that he can use to help with his grief and sadness.  He has been going to therapy as an outpatient and will continue with that.  He feels safe, comfortable going home and will return if he has any return of suicidal thoughts.  He was advised to follow-up with his primary care provider for additional help.     Chastity Herbert MD  08/09/22 [id number]

## 2022-08-11 ENCOUNTER — OFFICE VISIT (OUTPATIENT)
Dept: FAMILY MEDICINE | Facility: CLINIC | Age: 52
End: 2022-08-11
Payer: COMMERCIAL

## 2022-08-11 VITALS
BODY MASS INDEX: 26.17 KG/M2 | OXYGEN SATURATION: 97 % | TEMPERATURE: 96.3 F | HEART RATE: 71 BPM | WEIGHT: 182.8 LBS | RESPIRATION RATE: 18 BRPM | SYSTOLIC BLOOD PRESSURE: 110 MMHG | HEIGHT: 70 IN | DIASTOLIC BLOOD PRESSURE: 60 MMHG

## 2022-08-11 DIAGNOSIS — R45.851 SUICIDAL IDEATION: ICD-10-CM

## 2022-08-11 DIAGNOSIS — F41.8 SITUATIONAL ANXIETY: ICD-10-CM

## 2022-08-11 DIAGNOSIS — G47.00 INSOMNIA, UNSPECIFIED TYPE: ICD-10-CM

## 2022-08-11 DIAGNOSIS — F41.1 GAD (GENERALIZED ANXIETY DISORDER): ICD-10-CM

## 2022-08-11 DIAGNOSIS — Z63.0 STRESS DUE TO MARITAL PROBLEMS: Primary | ICD-10-CM

## 2022-08-11 PROCEDURE — 99214 OFFICE O/P EST MOD 30 MIN: CPT | Performed by: PHYSICIAN ASSISTANT

## 2022-08-11 RX ORDER — ALPRAZOLAM 0.25 MG
0.25 TABLET ORAL 3 TIMES DAILY PRN
Qty: 20 TABLET | Refills: 0 | Status: SHIPPED | OUTPATIENT
Start: 2022-08-11 | End: 2022-09-02

## 2022-08-11 RX ORDER — TRAZODONE HYDROCHLORIDE 50 MG/1
50-100 TABLET, FILM COATED ORAL
Qty: 90 TABLET | Refills: 0 | Status: SHIPPED | OUTPATIENT
Start: 2022-08-11 | End: 2022-09-02

## 2022-08-11 SDOH — SOCIAL STABILITY - SOCIAL INSECURITY: PROBLEMS IN RELATIONSHIP WITH SPOUSE OR PARTNER: Z63.0

## 2022-08-11 ASSESSMENT — PATIENT HEALTH QUESTIONNAIRE - PHQ9
SUM OF ALL RESPONSES TO PHQ QUESTIONS 1-9: 19
SUM OF ALL RESPONSES TO PHQ QUESTIONS 1-9: 19
10. IF YOU CHECKED OFF ANY PROBLEMS, HOW DIFFICULT HAVE THESE PROBLEMS MADE IT FOR YOU TO DO YOUR WORK, TAKE CARE OF THINGS AT HOME, OR GET ALONG WITH OTHER PEOPLE: VERY DIFFICULT

## 2022-08-11 ASSESSMENT — ANXIETY QUESTIONNAIRES
2. NOT BEING ABLE TO STOP OR CONTROL WORRYING: NEARLY EVERY DAY
4. TROUBLE RELAXING: NEARLY EVERY DAY
5. BEING SO RESTLESS THAT IT IS HARD TO SIT STILL: MORE THAN HALF THE DAYS
IF YOU CHECKED OFF ANY PROBLEMS ON THIS QUESTIONNAIRE, HOW DIFFICULT HAVE THESE PROBLEMS MADE IT FOR YOU TO DO YOUR WORK, TAKE CARE OF THINGS AT HOME, OR GET ALONG WITH OTHER PEOPLE: VERY DIFFICULT
7. FEELING AFRAID AS IF SOMETHING AWFUL MIGHT HAPPEN: NEARLY EVERY DAY
1. FEELING NERVOUS, ANXIOUS, OR ON EDGE: NEARLY EVERY DAY
7. FEELING AFRAID AS IF SOMETHING AWFUL MIGHT HAPPEN: NEARLY EVERY DAY
GAD7 TOTAL SCORE: 20
3. WORRYING TOO MUCH ABOUT DIFFERENT THINGS: NEARLY EVERY DAY
8. IF YOU CHECKED OFF ANY PROBLEMS, HOW DIFFICULT HAVE THESE MADE IT FOR YOU TO DO YOUR WORK, TAKE CARE OF THINGS AT HOME, OR GET ALONG WITH OTHER PEOPLE?: VERY DIFFICULT
6. BECOMING EASILY ANNOYED OR IRRITABLE: NEARLY EVERY DAY

## 2022-08-11 NOTE — PROGRESS NOTES
"  Assessment & Plan     Stress due to marital problems  Suicidal ideation  Situational anxiety  JANAK (generalized anxiety disorder)  Insomnia, unspecified type  Significant situational stressors with marriage.  Continue sertraline 50 mg and start alprazolam 0.25 mg 3 times daily as needed for severe anxiety/panic.  Additionally, we will start patient on trazodone 50 mg to take 1 to 2 tablets nightly as needed for sleep.  Verbal contract for safety.  Resources given to the patient and her after visit summary as well.  Continue psychotherapy.  Close follow-up with me to see if further medication adjustments are needed with his sertraline.  Patient will keep me apprised of any adverse reactions in the interim.  - ALPRAZolam (XANAX) 0.25 MG tablet  Dispense: 20 tablet; Refill: 0  - sertraline (ZOLOFT) 50 MG tablet  Dispense: 90 tablet; Refill: 1  - traZODone (DESYREL) 50 MG tablet  Dispense: 90 tablet; Refill: 0       BMI:   Estimated body mass index is 26.61 kg/m  as calculated from the following:    Height as of this encounter: 1.765 m (5' 9.5\").    Weight as of this encounter: 82.9 kg (182 lb 12.8 oz).   Weight management plan: deferred    Depression Screening Follow Up      Last PHQ-9 8/11/2022   1.  Little interest or pleasure in doing things 3   2.  Feeling down, depressed, or hopeless 3   3.  Trouble falling or staying asleep, or sleeping too much 3   4.  Feeling tired or having little energy 1   5.  Poor appetite or overeating 2   6.  Feeling bad about yourself 3   7.  Trouble concentrating 3   8.  Moving slowly or restless 0   Q9: Thoughts of better off dead/self-harm past 2 weeks 1   PHQ-9 Total Score 19   Difficulty at work, home, or with people -   In the past two weeks have you had thoughts of suicide or self harm? Yes   Do you have concerns about your personal safety or the safety of others? No   In the past 2 weeks have you thought about a plan or had intention to harm yourself? Yes   In the past 2 weeks " "have you acted on these thoughts in any way? Yes         Follow Up      Follow Up Actions Taken  Crisis resource information provided in the After Visit Summary    Discussed the following ways the patient can remain in a safe environment:  be around others and therapy    Return in about 4 weeks (around 9/8/2022) for Medication recheck.     31 minutes spent on the date of the encounter doing chart review, history and exam, documentation and further activities per the note      Christine Payne MBA, MS, PA-C  [facility]   Richard is a 52 year old, presenting for the following health issues:  ER F/U       Depression & Anxiety - SI Emergency follow up   Facility: [facility] Emergency Department  Date of visit: 8/9/2022  Reason for visit: Suicidal  Current Status: Doing better, fluctuating course  ER note:  Richard Loza is a 52 year old male with history of Crohn's disease, no mental health history, presents to the ED for evaluation of suicidal thoughts.  Patient has never felt like this before.  He recently found out that his wife of 33 years filed for divorce and had been emotionally cheating on him with an old boyfiend.  He has been severely depressed.  Had thoughts of shooting himself or driving off a bridge.  Had participated in therapy which was provided by his job but did not feel that it was helping.  He is not sure what he needs but states that he is severely depressed and wants help.  Admits to alcohol use.  No drug use.  No medical complaints.  Has a stable job.    As of today: He is very stressed because he is living in his parents basement because his wife asked him to leave and he \"stupidly did it \".  They do have 2 children together who are taking it quite well.  Has been doing virtual therapy for the last month or so biweekly which has been helpful.  Today he reports that he does not have any active suicidal thoughts.  He feels that he is getting mixed " messages from his estranged wife and is unsure how to move forward.  They have not yet discussed marriage therapy.  He is hoping she would be willing to pursue this.  Overall, he feels that he was blindsided by this news/the request to move out of their home.  He feels that he does have a good support system.  He is having trouble sleeping and is having episodes of panic.    He continues to take his sertraline 50 mg daily.  He is interested in having something to help him with sleep.  He does use his Adderall regularly which has been quite helpful with his lack of sleep with this particular situational/relationship stress.    Of note, his Crohn's has been significantly flared since this occurred.  He continues on his Humira daily.    Depression and Anxiety Follow-Up    How are you doing with your depression since your last visit? Improved slightly    How are you doing with your anxiety since your last visit?  No change    Are you having other symptoms that might be associated with depression or anxiety? No    Have you had a significant life event? Relationship Concerns     Do you have any concerns with your use of alcohol or other drugs? No    Social History     Tobacco Use     Smoking status: Never Smoker     Smokeless tobacco: Never Used   Substance Use Topics     Alcohol use: Yes     Alcohol/week: 0.0 - 0.8 standard drinks     Comment: 1 drink/week     Drug use: No     PHQ 9/16/2020 4/21/2021 8/11/2022   PHQ-9 Total Score 0 0 19   Q9: Thoughts of better off dead/self-harm past 2 weeks Not at all Not at all Several days   F/U: Thoughts of suicide or self-harm - - Yes   F/U: Self harm-plan - - Yes   F/U: Self-harm action - - Yes   F/U: Safety concerns - - No     JANAK-7 SCORE 9/16/2020 4/21/2021 8/11/2022   Total Score - - -   Total Score - - 20 (severe anxiety)   Total Score 0 0 20     Last PHQ-9 8/11/2022   1.  Little interest or pleasure in doing things 3   2.  Feeling down, depressed, or hopeless 3   3.  Trouble  "falling or staying asleep, or sleeping too much 3   4.  Feeling tired or having little energy 1   5.  Poor appetite or overeating 2   6.  Feeling bad about yourself 3   7.  Trouble concentrating 3   8.  Moving slowly or restless 0   Q9: Thoughts of better off dead/self-harm past 2 weeks 1   PHQ-9 Total Score 19   Difficulty at work, home, or with people -   In the past two weeks have you had thoughts of suicide or self harm? Yes   Do you have concerns about your personal safety or the safety of others? No   In the past 2 weeks have you thought about a plan or had intention to harm yourself? Yes   In the past 2 weeks have you acted on these thoughts in any way? Yes     JANAK-7  8/11/2022   1. Feeling nervous, anxious, or on edge 3   2. Not being able to stop or control worrying 3   3. Worrying too much about different things 3   4. Trouble relaxing 3   5. Being so restless that it is hard to sit still 2   6. Becoming easily annoyed or irritable 3   7. Feeling afraid, as if something awful might happen 3   JANAK-7 Total Score 20   If you checked any problems, how difficult have they made it for you to do your work, take care of things at home, or get along with other people? Very difficult       Suicide Assessment Five-step Evaluation and Treatment (SAFE-T)        Review of Systems   Constitutional, HEENT, cardiovascular, pulmonary, GI, , musculoskeletal, neuro, skin, endocrine and psych systems are negative, except as otherwise noted.      Objective    /60   Pulse 71   Temp (!) 96.3  F (35.7  C) (Tympanic)   Resp 18   Ht 1.765 m (5' 9.5\")   Wt 82.9 kg (182 lb 12.8 oz)   SpO2 97%   BMI 26.61 kg/m    Body mass index is 26.61 kg/m .  Physical Exam   GENERAL: healthy, alert and no distress  EYES: Eyes grossly normal to inspection,   RESP: lungs clear to auscultation - no rales, rhonchi or wheezes  CV: regular rate and rhythm, normal S1 S2, no S3 or S4, no murmur, click or rub, no peripheral edema and " peripheral pulses strong  MS: no gross musculoskeletal defects noted, no edema  SKIN: no suspicious lesions or rashes  NEURO: Normal strength and tone, mentation intact and speech normal  PSYCH: mentation appears normal, affect depressed                    .  ..  Answers for HPI/ROS submitted by the patient on 8/11/2022  If you checked off any problems, how difficult have these problems made it for you to do your work, take care of things at home, or get along with other people?: Very difficult  PHQ9 TOTAL SCORE: 19  JANAK 7 TOTAL SCORE: 20  What is the reason for your visit today? : Discuss my depression  How many servings of fruits and vegetables do you eat daily?: 2-3  On average, how many sweetened beverages do you drink each day (Examples: soda, juice, sweet tea, etc.  Do NOT count diet or artificially sweetened beverages)?: 0  How many minutes a day do you exercise enough to make your heart beat faster?: 30 to 60  How many days a week do you exercise enough to make your heart beat faster?: 4  How many days per week do you miss taking your medication?: 0

## 2022-08-11 NOTE — PATIENT INSTRUCTIONS
[facility] Mobile Crisis Response Services  (contact the county where the person is physically located at the time of the crisis)  *[place] (Child and Adult):    [phone]  *[place] (Child and Adult):    [phone]  *[place] (Child and Adult):    [phone]  *[place] (Child):    [phone]    (Adult):    [phone]  *[place] (Child):    [phone]   (Adult):    [phone]  *[place] (Child and Adult):    [phone]  *[place] (Child and Adult):    [phone]  Other Crisis Resources (non-mobile)  *[facility]):    [phone]    Walk-in and telephone crisis intervention services (focuses on >18 year-olds)    Located at [address] [address]  *Suicide Hotlines:    [phone]  or  4-335-PLJKKTZ (992-1967)  or  2-635-912-TALK (2995) 697   Text Telephone:    7-910-864-5DNS (2111)   LGBT Youth Suicide Hotline:    8-128-1-U-ISAIAH  *[facility] ( women and children):    [phone]  or  808.645.2190  *[facility] Crisis Line ( women and children):    [phone]       Short-term Residential Crisis Resources  *[facility] (ages 10-17):  [phone]     [address] [place] [address]   *[facility] (Birth - 6 years):    [phone]  *[facility] (Birth - 12 years):    [phone]       Inpatient Hospitalization and Residential Evaluation  *[facility] (Child and Adult)     [address] [place] [address]    Inpatient Intake [phone]    Behavioral Emergency Center:    [phone]    Day Treatment/Behavioral Intake:    [phone]  *[facility] (Adult):    [phone]

## 2022-08-12 ENCOUNTER — TRANSFERRED RECORDS (OUTPATIENT)
Dept: HEALTH INFORMATION MANAGEMENT | Facility: CLINIC | Age: 52
End: 2022-08-12

## 2022-08-12 LAB
ALT SERPL-CCNC: 18 IU/L (ref 0–44)
AST SERPL-CCNC: 19 IU/L (ref 0–40)

## 2022-08-15 PROBLEM — Z63.0 STRESS DUE TO MARITAL PROBLEMS: Status: ACTIVE | Noted: 2022-08-15

## 2022-08-15 PROBLEM — G47.00 INSOMNIA, UNSPECIFIED TYPE: Status: ACTIVE | Noted: 2022-08-15

## 2022-08-30 DIAGNOSIS — G47.00 INSOMNIA, UNSPECIFIED TYPE: ICD-10-CM

## 2022-08-30 NOTE — TELEPHONE ENCOUNTER
Reason for Call:  Form, our goal is to have forms completed with 72 hours, however, some forms may require a visit or additional information.    Type of letter, form or note:  medical    Who is the form from?: [facility] Rx Clarification Request (if other please explain)    Where did the form come from: form was faxed in    What clinic location was the form placed at?: [facility]    Where the form was placed: [place] Box/Folder    What number is listed as a contact on the form?: [phone]           Call taken on 8/30/2022 at 2:06 PM by Vandana Gonzalez

## 2022-09-02 RX ORDER — TRAZODONE HYDROCHLORIDE 50 MG/1
50-100 TABLET, FILM COATED ORAL
Qty: 90 TABLET | Refills: 1 | Status: SHIPPED | OUTPATIENT
Start: 2022-09-02 | End: 2022-09-08

## 2022-09-02 NOTE — TELEPHONE ENCOUNTER
Found form, was misplaced with HIM scanning. Looks like RX clarification request from [facility] just needs a renewed RX for Trazodone 50mg, 180 days supply with 3 refills.     Gabe Ponce

## 2022-09-08 ENCOUNTER — OFFICE VISIT (OUTPATIENT)
Dept: FAMILY MEDICINE | Facility: CLINIC | Age: 52
End: 2022-09-08
Payer: COMMERCIAL

## 2022-09-08 VITALS
TEMPERATURE: 97.7 F | DIASTOLIC BLOOD PRESSURE: 72 MMHG | OXYGEN SATURATION: 96 % | HEART RATE: 78 BPM | SYSTOLIC BLOOD PRESSURE: 112 MMHG | BODY MASS INDEX: 24.91 KG/M2 | HEIGHT: 70 IN | RESPIRATION RATE: 14 BRPM | WEIGHT: 174 LBS

## 2022-09-08 DIAGNOSIS — F41.1 GAD (GENERALIZED ANXIETY DISORDER): ICD-10-CM

## 2022-09-08 DIAGNOSIS — Z79.899 CONTROLLED SUBSTANCE AGREEMENT SIGNED: ICD-10-CM

## 2022-09-08 DIAGNOSIS — Z63.0 STRESS DUE TO MARITAL PROBLEMS: Primary | ICD-10-CM

## 2022-09-08 DIAGNOSIS — D84.9 IMMUNOSUPPRESSED STATUS (H): ICD-10-CM

## 2022-09-08 DIAGNOSIS — R45.851 SUICIDAL IDEATION: ICD-10-CM

## 2022-09-08 DIAGNOSIS — G47.00 INSOMNIA, UNSPECIFIED TYPE: ICD-10-CM

## 2022-09-08 DIAGNOSIS — K50.80 CROHN'S DISEASE OF BOTH SMALL AND LARGE INTESTINE WITHOUT COMPLICATION (H): ICD-10-CM

## 2022-09-08 PROCEDURE — 99214 OFFICE O/P EST MOD 30 MIN: CPT | Performed by: PHYSICIAN ASSISTANT

## 2022-09-08 RX ORDER — TRAZODONE HYDROCHLORIDE 50 MG/1
50 TABLET, FILM COATED ORAL
Qty: 90 TABLET | Refills: 1 | Status: SHIPPED | OUTPATIENT
Start: 2022-09-08 | End: 2023-10-18

## 2022-09-08 RX ORDER — ALPRAZOLAM 0.25 MG
0.25 TABLET ORAL 3 TIMES DAILY PRN
Qty: 20 TABLET | Refills: 1 | Status: SHIPPED | OUTPATIENT
Start: 2022-09-08

## 2022-09-08 RX ORDER — ADALIMUMAB 40MG/0.4ML
KIT SUBCUTANEOUS
COMMUNITY
Start: 2022-08-18 | End: 2023-03-13

## 2022-09-08 SDOH — SOCIAL STABILITY - SOCIAL INSECURITY: PROBLEMS IN RELATIONSHIP WITH SPOUSE OR PARTNER: Z63.0

## 2022-09-08 ASSESSMENT — PATIENT HEALTH QUESTIONNAIRE - PHQ9
SUM OF ALL RESPONSES TO PHQ QUESTIONS 1-9: 8
SUM OF ALL RESPONSES TO PHQ QUESTIONS 1-9: 8
10. IF YOU CHECKED OFF ANY PROBLEMS, HOW DIFFICULT HAVE THESE PROBLEMS MADE IT FOR YOU TO DO YOUR WORK, TAKE CARE OF THINGS AT HOME, OR GET ALONG WITH OTHER PEOPLE: SOMEWHAT DIFFICULT

## 2022-09-08 ASSESSMENT — ANXIETY QUESTIONNAIRES
7. FEELING AFRAID AS IF SOMETHING AWFUL MIGHT HAPPEN: SEVERAL DAYS
GAD7 TOTAL SCORE: 6
7. FEELING AFRAID AS IF SOMETHING AWFUL MIGHT HAPPEN: SEVERAL DAYS
GAD7 TOTAL SCORE: 6
IF YOU CHECKED OFF ANY PROBLEMS ON THIS QUESTIONNAIRE, HOW DIFFICULT HAVE THESE PROBLEMS MADE IT FOR YOU TO DO YOUR WORK, TAKE CARE OF THINGS AT HOME, OR GET ALONG WITH OTHER PEOPLE: SOMEWHAT DIFFICULT
1. FEELING NERVOUS, ANXIOUS, OR ON EDGE: SEVERAL DAYS
3. WORRYING TOO MUCH ABOUT DIFFERENT THINGS: SEVERAL DAYS
GAD7 TOTAL SCORE: 6
6. BECOMING EASILY ANNOYED OR IRRITABLE: NOT AT ALL
4. TROUBLE RELAXING: SEVERAL DAYS
8. IF YOU CHECKED OFF ANY PROBLEMS, HOW DIFFICULT HAVE THESE MADE IT FOR YOU TO DO YOUR WORK, TAKE CARE OF THINGS AT HOME, OR GET ALONG WITH OTHER PEOPLE?: SOMEWHAT DIFFICULT
2. NOT BEING ABLE TO STOP OR CONTROL WORRYING: SEVERAL DAYS
5. BEING SO RESTLESS THAT IT IS HARD TO SIT STILL: SEVERAL DAYS

## 2022-09-08 ASSESSMENT — PAIN SCALES - GENERAL: PAINLEVEL: NO PAIN (0)

## 2022-09-08 NOTE — PROGRESS NOTES
Assessment & Plan     Stress due to marital problems  JANAK (generalized anxiety disorder)  Controlled substance agreement signed - 9/8/2022 - Adderall 5 mg #120 monthly, q6 month OV, alprazolam 0.25 mg #20 monthly  Suicidal ideation  Symptoms improved with additional therapy and as needed use of alprazolam.  Using approximately every other day at this point in time.  Anticipate that the need for the benzodiazepine will gradually decrease as time continues.  We will trial an increase of sertraline from 50 mg to 75 mg.  Verbal contract for safety.  We will send him an automated Sample6 message with a repeat PHQ-9/JANAK in 4 to 5 weeks to ensure things are improving/he is doing well.  He has been advised to contact me prior to that time should anything change in the interim.  - ALPRAZolam (XANAX) 0.25 MG tablet  Dispense: 20 tablet; Refill: 1  - sertraline (ZOLOFT) 50 MG tablet  Dispense: 135 tablet; Refill: 0    Insomnia, unspecified type  Sleep significantly improved.  Continue trazodone as needed.  Refilled today.  - traZODone (DESYREL) 50 MG tablet  Dispense: 90 tablet; Refill: 1    Crohns disease (H) - crohn's - ileocolonic; followed by Dr. Lopez every 6 months  Immunosuppressed status (H)- secondary to Humira injections every other week   Flare improving.  Continue medication regimen and follow-up with GI as recommended.  - HUMIRA *CF* PEN 40 MG/0.4ML pen kit      Return in about 5 weeks (around 10/13/2022) for Replied to automated Sample6 message/questionnaires.    Christine Payne PA-C  [facility] NELDA Ramos is a 52 year old, presenting for the following health issues:  MOOD CHANGES       History of Present Illness       Depression/anxiety follow-up   since last visit has been:  Better  The patient is not having other symptoms associated with depression.  Patient's anxiety since last visit has been:  Better  The patient is not having other symptoms associated with  anxiety.  Any significant life events: relationship concerns  Patient is feeling anxious or having panic attacks.  Patient has no concerns about alcohol or drug use.     Richard reports that overall he is doing better since last seen 1 month ago.  The situation with he and his wife separation has not changed but he continues to work through this as he is able.  He continues to work with a therapist.  He continues on his sertraline 50 mg but does feel that he may have benefits from increasing this dose slightly.  He has utilized the alprazolam for situational panic and anxiety and this has worked quite well.  He denies any negative side effects from utilization of this medication.  His sleep has significantly improved with trazodone 50 mg (reports using this approximately 80% of the time).  He denies any fogginess into the next day.  He denies any current suicidal or homicidal ideation.    Crohn's disease  His Crohn's has improved since he was seen 1 month ago.  He attributes this to the improvement in his mental health.  He is still having a slight flare but it is markedly improved.      Today's PHQ-9         PHQ-9 Total Score: 8    PHQ-9 Q9 Thoughts of better off dead/self-harm past 2 weeks :   Not at all    How difficult have these problems made it for you to do your work, take care of things at home, or get along with other people: Somewhat difficult  Today's JANAK-7 Score: 6     Social History     Tobacco Use     Smoking status: Never Smoker     Smokeless tobacco: Never Used   Vaping Use     Vaping Use: Never used   Substance Use Topics     Alcohol use: Yes     Alcohol/week: 0.0 - 0.8 standard drinks     Comment: 1 drink/week     Drug use: No     PHQ 4/21/2021 8/11/2022 9/8/2022   PHQ-9 Total Score 0 19 8   Q9: Thoughts of better off dead/self-harm past 2 weeks Not at all Several days Not at all   F/U: Thoughts of suicide or self-harm - Yes -   F/U: Self harm-plan - Yes -   F/U: Self-harm action - Yes -   F/U:  "Safety concerns - No -     JANAK-7 SCORE 4/21/2021 8/11/2022 9/8/2022   Total Score - - -   Total Score - 20 (severe anxiety) 6 (mild anxiety)   Total Score 0 20 6     Last PHQ-9 9/8/2022   1.  Little interest or pleasure in doing things 1   2.  Feeling down, depressed, or hopeless 1   3.  Trouble falling or staying asleep, or sleeping too much 2   4.  Feeling tired or having little energy 1   5.  Poor appetite or overeating 1   6.  Feeling bad about yourself 1   7.  Trouble concentrating 1   8.  Moving slowly or restless 0   Q9: Thoughts of better off dead/self-harm past 2 weeks 0   PHQ-9 Total Score 8   Difficulty at work, home, or with people -   In the past two weeks have you had thoughts of suicide or self harm? -   Do you have concerns about your personal safety or the safety of others? -   In the past 2 weeks have you thought about a plan or had intention to harm yourself? -   In the past 2 weeks have you acted on these thoughts in any way? -     JANAK-7  9/8/2022   1. Feeling nervous, anxious, or on edge 1   2. Not being able to stop or control worrying 1   3. Worrying too much about different things 1   4. Trouble relaxing 1   5. Being so restless that it is hard to sit still 1   6. Becoming easily annoyed or irritable 0   7. Feeling afraid, as if something awful might happen 1   JANAK-7 Total Score 6   If you checked any problems, how difficult have they made it for you to do your work, take care of things at home, or get along with other people? Somewhat difficult       Suicide Assessment Five-step Evaluation and Treatment (SAFE-T)        Review of Systems   Constitutional, HEENT, cardiovascular, pulmonary, GI, , musculoskeletal, neuro, skin, endocrine and psych systems are negative, except as otherwise noted.      Objective    /72   Pulse 78   Temp 97.7  F (36.5  C) (Tympanic)   Resp 14   Ht 1.765 m (5' 9.5\")   Wt 78.9 kg (174 lb)   SpO2 96%   BMI 25.33 kg/m    Body mass index is 25.33 " kg/m .  Physical Exam   GENERAL: healthy, alert and no distress  EYES: Eyes grossly normal to inspection  RESP: lungs clear to auscultation - no rales, rhonchi or wheezes  CV: regular rate and rhythm, normal S1 S2, no S3 or S4, no murmur, click or rub, no peripheral edema and peripheral pulses strong  MS: no gross musculoskeletal defects noted, no edema  SKIN: no suspicious lesions or rashes  NEURO: Normal strength and tone, mentation intact and speech normal  PSYCH: mentation appears normal, affect normal/bright

## 2022-09-08 NOTE — LETTER
[facility] [place]  09/08/22  Patient: Richard Loza  YOB: 1970  Medical Record Number: [id number]                                                                                  Non-Opioid Controlled Substance Agreement    This is an agreement between you and your provider regarding safe and appropriate use of controlled substances prescribed by your care team. Controlled substances are?medicines that can cause physical and mental dependence (abuse).     There are strict laws about having and using these medicines. We here at [facility] are  committed to working with you in your efforts to get better. To support you in this work, we'll help you schedule regular office appointments for medicine refills. If we must cancel or change your appointment for any reason, we'll make sure you have enough medicine to last until your next appointment.     As a Provider, I will:     Listen carefully to your concerns while treating you with respect.     Recommend a treatment plan that I believe is in your best interest and may involve therapies other than medicine.      Talk with you often about the possible benefits and the risk of harm of any medicine that we prescribe for you.    Assess the safety of this medicine and check how well it works.      Provide a plan on how to taper (discontinue or go off) using this medicine if the decision is made to stop its use.      ::  As a Patient, I understand controlled substances:       Are prescribed by my care provider to help me function or work and manage my condition(s).?    Are strong medicines and can cause serious side effects.       Need to be taken exactly as prescribed.?Combining controlled substances with certain medicines or chemicals (such as illegal drugs, alcohol, sedatives, sleeping pills, and benzodiazepines) can be dangerous or even fatal.? If I stop taking my medicines suddenly, I may have severe withdrawal symptoms.     The  risks, benefits, and side effects of these medicine(s) were explained to me. I agree that:    1. I will take part in other treatments as advised by my care team. This may be psychiatry or counseling, physical therapy, behavioral therapy, group treatment or a referral to specialist.    2. I will keep all my appointments and understand this is part of the monitoring of controlled substances.?My care team may require an office visit for EVERY controlled substance refill. If I miss appointments or don t follow instructions, my care team may stop my medicine    3. I will take my medicines as prescribed. I will not change the dose or schedule unless my care team tells me to. There will be no refills if I run out early.      4. I may be asked to come to the clinic and complete a urine drug test or complete a pill count. If I don t give a urine sample or participate in a pill count, the care team may stop my medicine.    5. I will only receive controlled substance prescriptions from this clinic. If I am treated by another provider, I will tell them that I am taking controlled substances and that I have a treatment agreement with this provider. I will inform my [facility] care team within one business day if I am given a prescription for any controlled substance by another healthcare provider. My [facility] care team can contact other providers and pharmacists about my use of any medicines.    6. It is up to me to make sure that I don't run out of my medicines on weekends or holidays.?If my care team is willing to refill my prescription without a visit, I must request refills only during office hours. Refills may take up to 3 business days to process. I will use one pharmacy to fill all my controlled substance prescriptions. I will notify the clinic about any changes to my insurance or medicine availability.    7. I am responsible for my prescriptions. If the medicine/prescription is lost, stolen or destroyed,  it will not be replaced.?I also agree not to share controlled substance medicines with anyone.     8. I am aware I should not use any illegal or recreational drugs. I agree not to drink alcohol unless my care team says I can.     9. If I enroll in the [place] Medical Cannabis program, I will tell my care team before my next refill.    10. I will tell my care team right away if I become pregnant, have a new medical problem treated outside of my regular clinic, or have a change in my medicines.     11. I understand that this medicine can affect my thinking, judgment and reaction time.? Alcohol and drugs affect the brain and body, which can affect the safety of my driving. Being under the influence of alcohol or drugs can affect my decision-making, behaviors, personal safety and the safety of others. Driving while impaired (DWI) can occur if a person is driving, operating or in physical control of a car, motorcycle, boat, snowmobile, ATV, motorbike, off-road vehicle or any other motor vehicle ([place] Statute 169A.20). I understand the risk if I choose to drive or operate any vehicle or machinery.    I understand that if I do not follow any of the conditions above, my prescriptions or treatment may be stopped or changed.   I agree that my provider, clinic care team and pharmacy may work with any city, state or federal law enforcement agency that investigates the misuse, sale or other diversion of my controlled medicine. I will allow my provider to discuss my care with, or share a copy of, this agreement with any other treating provider, pharmacy or emergency room where I receive care.     I have read this agreement and have asked questions about anything I did not understand.    ________________________________________________________  Patient Signature - Richard Loza     ___________________                   Date     ________________________________________________________  Provider Signature - Christine Payne,  PA-C       ___________________                   Date     ________________________________________________________  Witness Signature (required if provider not present while patient signing)          ___________________                   Date

## 2022-09-09 NOTE — PATIENT INSTRUCTIONS
[facility] Mobile Crisis Response Services  (contact the county where the person is physically located at the time of the crisis)  *[place] (Child and Adult):    [phone]  *[place] (Child and Adult):    [phone]  *[place] (Child and Adult):    [phone]  *[place] (Child):    [phone]    (Adult):    [phone]  *[place] (Child):    [phone]   (Adult):    [phone]  *[place] (Child and Adult):    [phone]  *[place] (Child and Adult):    [phone]  Other Crisis Resources (non-mobile)  *[facility]ute [facility] (formerly Crisis [facility]vention Center):    [phone]    Walk-in and telephone crisis intervention services (focuses on >18 year-olds)    Located at [address] [address]  *Suicide Hotlines:    821.120.4605  or  0-826-NUVIGSZ (520-3114)  or  6-083-707-TALK (2908)   Text Telephone:    1-407-034-4TTY (1131)   LGBT Youth Suicide Hotline:    3-888-0-U-ISAIAH  *[facility] ( women and children):    [phone]  or  [phone]  *[facility] Crisis Line ( women and children):    571.842.4530       Short-term Residential Crisis Resources  *[facility] (ages 10-17):  [phone]     [address] [place] [address]   *[facility] (Birth - 6 years):    [phone]  *[facility] (Birth - 12 years):    [phone]       Inpatient Hospitalization and Residential Evaluation  *[facility] (Child and Adult)     [address] [place] [address]    Inpatient Intake [phone]    [facility]:    [phone]    Day Treatment/Behavioral Intake:    [phone]  *[facility] (Adult):    [phone]

## 2022-09-29 DIAGNOSIS — Z79.899 CONTROLLED SUBSTANCE AGREEMENT SIGNED: ICD-10-CM

## 2022-09-29 DIAGNOSIS — F90.2 ATTENTION DEFICIT HYPERACTIVITY DISORDER (ADHD), COMBINED TYPE: ICD-10-CM

## 2022-09-29 RX ORDER — DEXTROAMPHETAMINE SACCHARATE, AMPHETAMINE ASPARTATE, DEXTROAMPHETAMINE SULFATE AND AMPHETAMINE SULFATE 1.25; 1.25; 1.25; 1.25 MG/1; MG/1; MG/1; MG/1
TABLET ORAL
Qty: 120 TABLET | Refills: 0 | Status: SHIPPED | OUTPATIENT
Start: 2022-09-29 | End: 2022-12-12

## 2022-10-05 NOTE — PROGRESS NOTES
"  SUBJECTIVE:                                                    Richard Loza is a 47 year old male who presents to clinic today for the following health issues:      Medication Followup of Adderall    Taking Medication as prescribed: yes    Side Effects:  None    Medication Helping Symptoms:  yes      Anxiety Follow-Up    Status since last visit: Stable     Other associated symptoms:None    Complicating factors:   Significant life event: No   Current substance abuse: None  Depression symptoms: No  JANAK-7 SCORE 8/24/2017 9/25/2017 10/9/2017   Total Score - - -   Total Score 6 5 0       GAD7    PHQ-9 SCORE 8/24/2017 9/25/2017 10/9/2017   Total Score - - -   Total Score 5 4 0         Amount of exercise or physical activity: 3 days/week for an average of 60 min    Problems taking medications regularly: No    Medication side effects: none    Diet: gluten-free/reduced and Dairy free      Patient reports to doing well.  He denies any issues or side effects on the medications.  He is feeling good and feels like his dose of medication is right for him.      He does not have concerns today.      Problem list and histories reviewed & adjusted, as indicated.  Additional history: as documented      ROS:  Constitutional, HEENT, cardiovascular, pulmonary, GI, , musculoskeletal, neuro, skin, endocrine and psych systems are negative, except as otherwise noted.    OBJECTIVE:                                                    /80 (BP Location: Left arm, Patient Position: Chair, Cuff Size: Adult Regular)  Pulse 83  Temp 98.7  F (37.1  C) (Oral)  Ht 5' 9\" (1.753 m)  Wt 180 lb (81.6 kg)  SpO2 98%  BMI 26.58 kg/m2  Body mass index is 26.58 kg/(m^2).  GENERAL: healthy, alert and no distress  EYES: Eyes grossly normal to inspection, PERRL and conjunctivae and sclerae normal  RESP: lungs clear to auscultation - no rales, rhonchi or wheezes  CV: regular rate and rhythm, normal S1 S2, no S3 or S4, no murmur, click or rub, no " Lab Results   Component Value Date    HGBA1C 5 7 10/04/2022   controlled, diet controlled peripheral edema and peripheral pulses strong  MS: no gross musculoskeletal defects noted, no edema  NEURO: Normal strength and tone, mentation intact and speech normal  PSYCH: mentation appears normal, affect normal/bright    Diagnostic Test Results:  none      ASSESSMENT/PLAN:                                                      Richard was seen today for recheck medication.    Diagnoses and all orders for this visit:    Anxiety state  -     Discontinue: sertraline (ZOLOFT) 50 MG tablet; Take 1 tablet (50 mg) by mouth daily  -     sertraline (ZOLOFT) 50 MG tablet; Take 1 tablet (50 mg) by mouth daily    Attention deficit hyperactivity disorder (ADHD), combined type  -     Discontinue: amphetamine-dextroamphetamine (ADDERALL) 5 MG per tablet; Take 1 tablet (5 mg) by mouth 3 times daily  -     Discontinue: amphetamine-dextroamphetamine (ADDERALL) 5 MG per tablet; Take 1 tablet (5 mg) by mouth 3 times daily  -     amphetamine-dextroamphetamine (ADDERALL) 5 MG per tablet; Take 1 tablet (5 mg) by mouth 3 times daily    - Patient is doing well on his medications.  He needs refills today.    - 3 months of Rx's provided for the next 3 months.  Patient advised not to loose the hard copy prescriptions.   - Followup in 6 months, or sooner if needed.   - Patient to continue Zoloft as prescribed.   - Patient contracts for safety today and denies having thoughts of wanting to hurt himself or others.      - I have discussed the patient's diagnosis, and my plan of treatment with the patient and/or family. Patient is aware to to followup if symptoms do not improve.  Patient has been advised to be seen sooner or seek more immediate care if symptoms change or worsen.  Patient agrees with and understands the plan today.     See Patient Instructions        Muna Mcclain PA-C    [facility] [place]

## 2022-10-10 ENCOUNTER — HEALTH MAINTENANCE LETTER (OUTPATIENT)
Age: 52
End: 2022-10-10

## 2022-12-06 DIAGNOSIS — Z63.0 STRESS DUE TO MARITAL PROBLEMS: ICD-10-CM

## 2022-12-06 DIAGNOSIS — F41.1 GAD (GENERALIZED ANXIETY DISORDER): ICD-10-CM

## 2022-12-06 SDOH — SOCIAL STABILITY - SOCIAL INSECURITY: PROBLEMS IN RELATIONSHIP WITH SPOUSE OR PARTNER: Z63.0

## 2022-12-08 NOTE — TELEPHONE ENCOUNTER
We will send refill once patient updates PHQ-9/JANAK as long as scores seem reasonable.    Patient will be due for annual physical in the next 3 to 4 months.      Christine Payne MBA, MS, PA-C  [facility]

## 2022-12-08 NOTE — TELEPHONE ENCOUNTER
Routing to provider for review and advise. Note dated 9/8/22 states you would like follow up and/or questionnaire. Unsure if patient can do PHQ 9 on MyChart or if you want him to make an appointment for further refills?

## 2022-12-12 DIAGNOSIS — F90.2 ATTENTION DEFICIT HYPERACTIVITY DISORDER (ADHD), COMBINED TYPE: Primary | ICD-10-CM

## 2022-12-12 DIAGNOSIS — Z79.899 CONTROLLED SUBSTANCE AGREEMENT SIGNED: ICD-10-CM

## 2022-12-12 RX ORDER — DEXTROAMPHETAMINE SACCHARATE, AMPHETAMINE ASPARTATE, DEXTROAMPHETAMINE SULFATE AND AMPHETAMINE SULFATE 1.25; 1.25; 1.25; 1.25 MG/1; MG/1; MG/1; MG/1
TABLET ORAL
Qty: 120 TABLET | Refills: 0 | Status: SHIPPED | OUTPATIENT
Start: 2022-12-12 | End: 2023-03-13

## 2023-02-08 ENCOUNTER — TRANSFERRED RECORDS (OUTPATIENT)
Dept: HEALTH INFORMATION MANAGEMENT | Facility: CLINIC | Age: 53
End: 2023-02-08
Payer: COMMERCIAL

## 2023-03-13 DIAGNOSIS — F90.2 ATTENTION DEFICIT HYPERACTIVITY DISORDER (ADHD), COMBINED TYPE: ICD-10-CM

## 2023-03-13 DIAGNOSIS — Z79.899 CONTROLLED SUBSTANCE AGREEMENT SIGNED: ICD-10-CM

## 2023-03-13 RX ORDER — DEXTROAMPHETAMINE SACCHARATE, AMPHETAMINE ASPARTATE, DEXTROAMPHETAMINE SULFATE AND AMPHETAMINE SULFATE 1.25; 1.25; 1.25; 1.25 MG/1; MG/1; MG/1; MG/1
TABLET ORAL
Qty: 120 TABLET | Refills: 0 | Status: SHIPPED | OUTPATIENT
Start: 2023-03-15 | End: 2023-07-14

## 2023-03-13 RX ORDER — ADALIMUMAB 40MG/0.4ML
0.4 KIT SUBCUTANEOUS
COMMUNITY
Start: 2022-09-15

## 2023-06-11 ENCOUNTER — HEALTH MAINTENANCE LETTER (OUTPATIENT)
Age: 53
End: 2023-06-11

## 2023-07-14 DIAGNOSIS — F90.2 ATTENTION DEFICIT HYPERACTIVITY DISORDER (ADHD), COMBINED TYPE: ICD-10-CM

## 2023-07-14 DIAGNOSIS — Z79.899 CONTROLLED SUBSTANCE AGREEMENT SIGNED: ICD-10-CM

## 2023-07-14 RX ORDER — DEXTROAMPHETAMINE SACCHARATE, AMPHETAMINE ASPARTATE, DEXTROAMPHETAMINE SULFATE AND AMPHETAMINE SULFATE 1.25; 1.25; 1.25; 1.25 MG/1; MG/1; MG/1; MG/1
TABLET ORAL
Qty: 120 TABLET | Refills: 0 | Status: SHIPPED | OUTPATIENT
Start: 2023-07-14 | End: 2023-10-18

## 2023-08-22 ENCOUNTER — TRANSFERRED RECORDS (OUTPATIENT)
Dept: HEALTH INFORMATION MANAGEMENT | Facility: CLINIC | Age: 53
End: 2023-08-22
Payer: COMMERCIAL

## 2023-10-06 ENCOUNTER — TRANSFERRED RECORDS (OUTPATIENT)
Dept: HEALTH INFORMATION MANAGEMENT | Facility: CLINIC | Age: 53
End: 2023-10-06
Payer: COMMERCIAL

## 2023-10-06 LAB
ALT SERPL-CCNC: 17 IU/L (ref 0–44)
AST SERPL-CCNC: 17 IU/L (ref 0–40)

## 2023-10-18 ENCOUNTER — OFFICE VISIT (OUTPATIENT)
Dept: FAMILY MEDICINE | Facility: CLINIC | Age: 53
End: 2023-10-18
Payer: COMMERCIAL

## 2023-10-18 VITALS
RESPIRATION RATE: 11 BRPM | BODY MASS INDEX: 27.28 KG/M2 | HEIGHT: 68 IN | SYSTOLIC BLOOD PRESSURE: 124 MMHG | TEMPERATURE: 97.2 F | HEART RATE: 82 BPM | WEIGHT: 180 LBS | OXYGEN SATURATION: 95 % | DIASTOLIC BLOOD PRESSURE: 86 MMHG

## 2023-10-18 DIAGNOSIS — Z13.29 SCREENING FOR THYROID DISORDER: ICD-10-CM

## 2023-10-18 DIAGNOSIS — Z00.00 ROUTINE GENERAL MEDICAL EXAMINATION AT A HEALTH CARE FACILITY: Primary | ICD-10-CM

## 2023-10-18 DIAGNOSIS — F90.2 ATTENTION DEFICIT HYPERACTIVITY DISORDER (ADHD), COMBINED TYPE: ICD-10-CM

## 2023-10-18 DIAGNOSIS — G47.00 INSOMNIA, UNSPECIFIED TYPE: ICD-10-CM

## 2023-10-18 DIAGNOSIS — Z79.899 CONTROLLED SUBSTANCE AGREEMENT SIGNED: ICD-10-CM

## 2023-10-18 DIAGNOSIS — D84.9 IMMUNOSUPPRESSED STATUS (H): ICD-10-CM

## 2023-10-18 DIAGNOSIS — E78.2 ELEVATED TRIGLYCERIDES WITH HIGH CHOLESTEROL: ICD-10-CM

## 2023-10-18 DIAGNOSIS — Z12.5 SCREENING FOR PROSTATE CANCER: ICD-10-CM

## 2023-10-18 DIAGNOSIS — F41.1 GAD (GENERALIZED ANXIETY DISORDER): ICD-10-CM

## 2023-10-18 DIAGNOSIS — Z13.0 SCREENING FOR DEFICIENCY ANEMIA: ICD-10-CM

## 2023-10-18 DIAGNOSIS — K50.80 CROHN'S DISEASE OF BOTH SMALL AND LARGE INTESTINE WITHOUT COMPLICATION (H): ICD-10-CM

## 2023-10-18 LAB
ALBUMIN SERPL BCG-MCNC: 4.5 G/DL (ref 3.5–5.2)
ALP SERPL-CCNC: 79 U/L (ref 40–129)
ALT SERPL W P-5'-P-CCNC: 15 U/L (ref 0–70)
AMPHETAMINES UR QL: DETECTED
ANION GAP SERPL CALCULATED.3IONS-SCNC: 8 MMOL/L (ref 7–15)
AST SERPL W P-5'-P-CCNC: 19 U/L (ref 0–45)
BARBITURATES UR QL SCN: NOT DETECTED
BENZODIAZ UR QL SCN: NOT DETECTED
BILIRUB SERPL-MCNC: 0.8 MG/DL
BUN SERPL-MCNC: 15.6 MG/DL (ref 6–20)
BUPRENORPHINE UR QL: NOT DETECTED
CALCIUM SERPL-MCNC: 9.5 MG/DL (ref 8.6–10)
CANNABINOIDS UR QL: NOT DETECTED
CHLORIDE SERPL-SCNC: 104 MMOL/L (ref 98–107)
CHOLEST SERPL-MCNC: 154 MG/DL
COCAINE UR QL SCN: NOT DETECTED
CREAT SERPL-MCNC: 1.11 MG/DL (ref 0.67–1.17)
D-METHAMPHET UR QL: NOT DETECTED
DEPRECATED HCO3 PLAS-SCNC: 28 MMOL/L (ref 22–29)
EGFRCR SERPLBLD CKD-EPI 2021: 80 ML/MIN/1.73M2
ERYTHROCYTE [DISTWIDTH] IN BLOOD BY AUTOMATED COUNT: 11.7 % (ref 10–15)
GLUCOSE SERPL-MCNC: 93 MG/DL (ref 70–99)
HCT VFR BLD AUTO: 45.1 % (ref 40–53)
HDLC SERPL-MCNC: 43 MG/DL
HGB BLD-MCNC: 15.6 G/DL (ref 13.3–17.7)
LDLC SERPL CALC-MCNC: 79 MG/DL
MCH RBC QN AUTO: 31.1 PG (ref 26.5–33)
MCHC RBC AUTO-ENTMCNC: 34.6 G/DL (ref 31.5–36.5)
MCV RBC AUTO: 90 FL (ref 78–100)
METHADONE UR QL SCN: NOT DETECTED
NONHDLC SERPL-MCNC: 111 MG/DL
OPIATES UR QL SCN: NOT DETECTED
OXYCODONE UR QL SCN: NOT DETECTED
PCP UR QL SCN: NOT DETECTED
PLATELET # BLD AUTO: 217 10E3/UL (ref 150–450)
POTASSIUM SERPL-SCNC: 4.8 MMOL/L (ref 3.4–5.3)
PROT SERPL-MCNC: 7.7 G/DL (ref 6.4–8.3)
PSA SERPL DL<=0.01 NG/ML-MCNC: 0.74 NG/ML (ref 0–3.5)
RBC # BLD AUTO: 5.01 10E6/UL (ref 4.4–5.9)
SODIUM SERPL-SCNC: 140 MMOL/L (ref 135–145)
TRICYCLICS UR QL SCN: NOT DETECTED
TRIGL SERPL-MCNC: 159 MG/DL
TSH SERPL DL<=0.005 MIU/L-ACNC: 0.4 UIU/ML (ref 0.3–4.2)
WBC # BLD AUTO: 6.3 10E3/UL (ref 4–11)

## 2023-10-18 PROCEDURE — 90677 PCV20 VACCINE IM: CPT | Performed by: NURSE PRACTITIONER

## 2023-10-18 PROCEDURE — 84443 ASSAY THYROID STIM HORMONE: CPT | Performed by: NURSE PRACTITIONER

## 2023-10-18 PROCEDURE — G0103 PSA SCREENING: HCPCS | Performed by: NURSE PRACTITIONER

## 2023-10-18 PROCEDURE — 80053 COMPREHEN METABOLIC PANEL: CPT | Performed by: NURSE PRACTITIONER

## 2023-10-18 PROCEDURE — 36415 COLL VENOUS BLD VENIPUNCTURE: CPT | Performed by: NURSE PRACTITIONER

## 2023-10-18 PROCEDURE — 80306 DRUG TEST PRSMV INSTRMNT: CPT | Performed by: NURSE PRACTITIONER

## 2023-10-18 PROCEDURE — 90471 IMMUNIZATION ADMIN: CPT | Performed by: NURSE PRACTITIONER

## 2023-10-18 PROCEDURE — 99396 PREV VISIT EST AGE 40-64: CPT | Mod: 25 | Performed by: NURSE PRACTITIONER

## 2023-10-18 PROCEDURE — 80061 LIPID PANEL: CPT | Performed by: NURSE PRACTITIONER

## 2023-10-18 PROCEDURE — 85027 COMPLETE CBC AUTOMATED: CPT | Performed by: NURSE PRACTITIONER

## 2023-10-18 PROCEDURE — 99214 OFFICE O/P EST MOD 30 MIN: CPT | Mod: 25 | Performed by: NURSE PRACTITIONER

## 2023-10-18 RX ORDER — TRAZODONE HYDROCHLORIDE 50 MG/1
50 TABLET, FILM COATED ORAL
Qty: 90 TABLET | Refills: 3 | Status: SHIPPED | OUTPATIENT
Start: 2023-10-18

## 2023-10-18 RX ORDER — DEXTROAMPHETAMINE SACCHARATE, AMPHETAMINE ASPARTATE, DEXTROAMPHETAMINE SULFATE AND AMPHETAMINE SULFATE 1.25; 1.25; 1.25; 1.25 MG/1; MG/1; MG/1; MG/1
TABLET ORAL
Qty: 120 TABLET | Refills: 0 | Status: SHIPPED | OUTPATIENT
Start: 2023-10-18 | End: 2024-01-13

## 2023-10-18 RX ORDER — ALPRAZOLAM 0.25 MG
0.25 TABLET ORAL 3 TIMES DAILY PRN
Qty: 20 TABLET | Refills: 1 | Status: CANCELLED | OUTPATIENT
Start: 2023-10-18

## 2023-10-18 ASSESSMENT — ENCOUNTER SYMPTOMS
NAUSEA: 0
CHILLS: 0
ABDOMINAL PAIN: 0
DYSURIA: 0
DIARRHEA: 0
COUGH: 0
WEAKNESS: 0
JOINT SWELLING: 0
HEMATOCHEZIA: 0
SHORTNESS OF BREATH: 0
ARTHRALGIAS: 0
HEARTBURN: 0
FREQUENCY: 0
PALPITATIONS: 0
SORE THROAT: 0
PARESTHESIAS: 0
CONSTIPATION: 0
EYE PAIN: 0
NERVOUS/ANXIOUS: 0
MYALGIAS: 0
DIZZINESS: 0
HEADACHES: 0
FEVER: 0
HEMATURIA: 0

## 2023-10-18 NOTE — PROGRESS NOTES
SUBJECTIVE:   CC: Richard is an 53 year old who presents for preventative health visit.       10/18/2023     8:43 AM   Additional Questions   Roomed by Jozef LUDWIG   Accompanied by Self       Healthy Habits:     Getting at least 3 servings of Calcium per day:  Yes    Bi-annual eye exam:  NO    Dental care twice a year:  Yes    Sleep apnea or symptoms of sleep apnea:  None    Diet:  Regular (no restrictions)    Frequency of exercise:  4-5 days/week    Duration of exercise:  30-45 minutes    Taking medications regularly:  Yes    Medication side effects:  None    Additional concerns today:  No    Has Crohn's follows with GI.    Has had multiple no-shows this year.  He reports he lives in the [facility] so it has been challenging to get to our clinic.  He wishes to continue care her at our clinic.  He found fasting for labs was harder to accomplish when appointments were in the afternoon.    Anxiety Follow-Up  How are you doing with your anxiety since your last visit? Improved feels as good as he has ever been  Are you having other symptoms that might be associated with anxiety? No  Have you had a significant life event? No   Are you feeling depressed? No  Do you have any concerns with your use of alcohol or other drugs? No    Social History     Tobacco Use    Smoking status: Never     Passive exposure: Never    Smokeless tobacco: Never   Vaping Use    Vaping Use: Never used   Substance Use Topics    Alcohol use: Yes     Alcohol/week: 0.0 - 0.8 standard drinks of alcohol     Comment: 1 drink/week    Drug use: No         4/21/2021     8:09 AM 8/11/2022     8:08 AM 9/8/2022     5:00 PM   JANAK-7 SCORE   Total Score  20 (severe anxiety) 6 (mild anxiety)   Total Score 0 20 6         4/21/2021     8:09 AM 8/11/2022     8:07 AM 9/8/2022     5:00 PM   PHQ   PHQ-9 Total Score 0 19 8   Q9: Thoughts of better off dead/self-harm past 2 weeks Not at all Several days Not at all   F/U: Thoughts of suicide or self-harm  Yes    F/U:  Self harm-plan  Yes    F/U: Self-harm action  Yes    F/U: Safety concerns  No      Medication Followup of Adderall 5mg  Taking Medication as prescribed: yes  Side Effects:  None  Medication Helping Symptoms:  yes      Medication Followup of Trazodone 50mg  Taking Medication as prescribed: yes - PRN  Side Effects:  None  Medication Helping Symptoms:  yes      Overall mood is much improved.    Stable on sertraline 75 mg daily.    Adderall 5 mg takes approximately 10 mg in the morning and 5 to 10 mg in the afternoon mindful of it affecting his sleep.    Takes trazodone with good improvement for sleep.      Triglycerides significantly elevated last year he is on OTC fish oil prescription not covered by insurance.  Has watched his carbohydrate and alcohol intake and has improved his activity with weight loss.    Wt Readings from Last 5 Encounters:   10/18/23 81.6 kg (180 lb)   09/08/22 78.9 kg (174 lb)   08/11/22 82.9 kg (182 lb 12.8 oz)   04/21/21 89.4 kg (197 lb)   02/05/20 88.5 kg (195 lb)          Social History     Tobacco Use    Smoking status: Never     Passive exposure: Never    Smokeless tobacco: Never   Substance Use Topics    Alcohol use: Yes     Alcohol/week: 0.0 - 0.8 standard drinks of alcohol     Comment: 1 drink/week           10/18/2023     8:45 AM   Alcohol Use   Prescreen: >3 drinks/day or >7 drinks/week? No       Last PSA:   PSA   Date Value Ref Range Status   04/22/2021 0.42 0 - 4 ug/L Final     Comment:     Assay Method:  Chemiluminescence using Siemens Vista analyzer       Reviewed orders with patient. Reviewed health maintenance and updated orders accordingly - Yes  Rest    Reviewed and updated as needed this visit by clinical staff   Tobacco  Allergies  Meds  Problems  Med Hx  Surg Hx  Fam Hx          Reviewed and updated as needed this visit by Provider   Tobacco  Allergies  Meds  Problems  Med Hx  Surg Hx  Fam Hx         Review of Systems   Constitutional:  Negative for chills and  "fever.   HENT:  Negative for congestion, ear pain, hearing loss and sore throat.    Eyes:  Negative for pain and visual disturbance.   Respiratory:  Negative for cough and shortness of breath.    Cardiovascular:  Negative for chest pain, palpitations and peripheral edema.   Gastrointestinal:  Negative for abdominal pain, constipation, diarrhea, heartburn, hematochezia and nausea.   Genitourinary:  Negative for dysuria, frequency, genital sores, hematuria, impotence, penile discharge and urgency.   Musculoskeletal:  Negative for arthralgias, joint swelling and myalgias.   Skin:  Negative for rash.   Neurological:  Negative for dizziness, weakness, headaches and paresthesias.   Psychiatric/Behavioral:  Negative for mood changes. The patient is not nervous/anxious.        OBJECTIVE:   /86 (BP Location: Right arm, Patient Position: Chair, Cuff Size: Adult Large)   Pulse 82   Temp 97.2  F (36.2  C) (Tympanic)   Resp 11   Ht 1.73 m (5' 8.1\")   Wt 81.6 kg (180 lb)   SpO2 95%   BMI 27.29 kg/m      Physical Exam  GENERAL: healthy, alert and no distress  EYES: Eyes grossly normal to inspection, PERRL and conjunctivae and sclerae normal  HENT: ear canals and TM's normal, nose and mouth without ulcers or lesions  NECK: no adenopathy, no asymmetry, masses, or scars and thyroid normal to palpation  RESP: lungs clear to auscultation - no rales, rhonchi or wheezes  CV: regular rate and rhythm, normal S1 S2, no S3 or S4, no murmur, click or rub, no peripheral edema and peripheral pulses strong  ABDOMEN: soft, nontender, no hepatosplenomegaly, no masses and bowel sounds normal  MS: no gross musculoskeletal defects noted, no edema  SKIN: no suspicious lesions or rashes  NEURO: Normal strength and tone, mentation intact and speech normal  PSYCH: mentation appears normal, affect normal/bright    Diagnostic Test Results:  Labs reviewed in Epic    ASSESSMENT/PLAN:   Richard was seen today for physical.    Diagnoses and all " orders for this visit:    Routine general medical examination at a health care facility  Wellness exam completed today.    Fasting labs today.    Will notify of lab results.    -     PRIMARY CARE FOLLOW-UP SCHEDULING; Future  -     REVIEW OF HEALTH MAINTENANCE PROTOCOL ORDERS    Insomnia, unspecified type  No concerns.  Stable.  Continue same medication this was refilled today.    -     traZODone (DESYREL) 50 MG tablet; Take 1 tablet (50 mg) by mouth nightly as needed for sleep    JANAK (generalized anxiety disorder)  No concerns.  Stable.  Continue same medication this was refilled today.    -     sertraline (ZOLOFT) 50 MG tablet; Take 1.5 tablets (75 mg) by mouth daily  -     Drug Abuse Screen Panel 13, Urine (Pain Care Map) - lab collect    Attention deficit hyperactivity disorder (ADHD), combined type  No concerns.  Stable.  Continue same medication this was refilled today.  Every 6-month appointments they can be done one in person one virtual.     -     amphetamine-dextroamphetamine (ADDERALL) 5 MG tablet; TAKE TWO TABLETS BY MOUTH EVERY MORNING, ONE TABLET WITH LUNCH AND ONE TABLET EVERY EVENING  -     Drug Abuse Screen Panel 13, Urine (Pain Care Map) - lab collect    PDMP, UDS, Controlled substance agreement signed - 10/18/2023 - Adderall 5 mg #120 monthly, q6 month OV, alprazolam 0.25 mg #20 monthly  -     Drug Abuse Screen Panel 13, Urine (Pain Care Map) - lab collect    Crohns disease (H) - crohn's - ileocolonic; followed by Dr. Lopez every 6 months    Immunosuppressed status (H)- secondary to Humira injections every other week   -     Pneumococcal 20 Valent Conjugate (Prevnar 20)    Elevated triglycerides with high cholesterol  -     Comprehensive metabolic panel (BMP + Alb, Alk Phos, ALT, AST, Total. Bili, TP); Future  -     Lipid panel reflex to direct LDL Fasting; Future  -     Comprehensive metabolic panel (BMP + Alb, Alk Phos, ALT, AST, Total. Bili, TP)  -     Lipid panel reflex to direct LDL  Fasting    Screening for prostate cancer  -     PROSTATE SPEC ANTIGEN SCREEN; Future  -     PROSTATE SPEC ANTIGEN SCREEN    Screening for thyroid disorder  -     TSH with free T4 reflex; Future  -     TSH with free T4 reflex    Screening for deficiency anemia  -     CBC with platelets; Future  -     CBC with platelets        Patient has been advised of split billing requirements and indicates understanding: Yes      COUNSELING:   Reviewed preventive health counseling, as reflected in patient instructions        He reports that he has never smoked. He has never been exposed to tobacco smoke. He has never used smokeless tobacco.             CONRAD Younger [facility] [place]

## 2023-10-18 NOTE — LETTER
[facility] [place]  10/18/23  Patient: Richard Loza  YOB: 1970  Medical Record Number: [id number]                                                                                  Non-Opioid Controlled Substance Agreement    This is an agreement between you and your provider regarding safe and appropriate use of controlled substances prescribed by your care team. Controlled substances are?medicines that can cause physical and mental dependence (abuse).     There are strict laws about having and using these medicines. We here at [facility] are  committed to working with you in your efforts to get better. To support you in this work, we'll help you schedule regular office appointments for medicine refills. If we must cancel or change your appointment for any reason, we'll make sure you have enough medicine to last until your next appointment.     As a Provider, I will:   Listen carefully to your concerns while treating you with respect.   Recommend a treatment plan that I believe is in your best interest and may involve therapies other than medicine.    Talk with you often about the possible benefits and the risk of harm of any medicine that we prescribe for you.  Assess the safety of this medicine and check how well it works.    Provide a plan on how to taper (discontinue or go off) using this medicine if the decision is made to stop its use.      ::  As a Patient, I understand controlled substances:     Are prescribed by my care provider to help me function or work and manage my condition(s).?  Are strong medicines and can cause serious side effects.     Need to be taken exactly as prescribed.?Combining controlled substances with certain medicines or chemicals (such as illegal drugs, alcohol, sedatives, sleeping pills, and benzodiazepines) can be dangerous or even fatal.? If I stop taking my medicines suddenly, I may have severe withdrawal symptoms.     The risks, benefits,  and side effects of these medicine(s) were explained to me. I agree that:    I will take part in other treatments as advised by my care team. This may be psychiatry or counseling, physical therapy, behavioral therapy, group treatment or a referral to specialist.    I will keep all my appointments and understand this is part of the monitoring of controlled substances.?My care team may require an office visit for EVERY controlled substance refill. If I miss appointments or don t follow instructions, my care team may stop my medicine    I will take my medicines as prescribed. I will not change the dose or schedule unless my care team tells me to. There will be no refills if I run out early.      I may be asked to come to the clinic and complete a urine drug test or complete a pill count. If I don t give a urine sample or participate in a pill count, the care team may stop my medicine.    I will only receive controlled substance prescriptions from this clinic. If I am treated by another provider, I will tell them that I am taking controlled substances and that I have a treatment agreement with this provider. I will inform my [facility] care team within one business day if I am given a prescription for any controlled substance by another healthcare provider. My [facility] care team can contact other providers and pharmacists about my use of any medicines.    It is up to me to make sure that I don't run out of my medicines on weekends or holidays.?If my care team is willing to refill my prescription without a visit, I must request refills only during office hours. Refills may take up to 3 business days to process. I will use one pharmacy to fill all my controlled substance prescriptions. I will notify the clinic about any changes to my insurance or medicine availability.    I am responsible for my prescriptions. If the medicine/prescription is lost, stolen or destroyed, it will not be replaced.?I also agree  not to share controlled substance medicines with anyone.     I am aware I should not use any illegal or recreational drugs. I agree not to drink alcohol unless my care team says I can.     If I enroll in the [place] Medical Cannabis program, I will tell my care team before my next refill.    I will tell my care team right away if I become pregnant, have a new medical problem treated outside of my regular clinic, or have a change in my medicines.     I understand that this medicine can affect my thinking, judgment and reaction time.? Alcohol and drugs affect the brain and body, which can affect the safety of my driving. Being under the influence of alcohol or drugs can affect my decision-making, behaviors, personal safety and the safety of others. Driving while impaired (DWI) can occur if a person is driving, operating or in physical control of a car, motorcycle, boat, snowmobile, ATV, motorbike, off-road vehicle or any other motor vehicle ([place] Statute 169A.20). I understand the risk if I choose to drive or operate any vehicle or machinery.    I understand that if I do not follow any of the conditions above, my prescriptions or treatment may be stopped or changed.   I agree that my provider, clinic care team and pharmacy may work with any city, state or federal law enforcement agency that investigates the misuse, sale or other diversion of my controlled medicine. I will allow my provider to discuss my care with, or share a copy of, this agreement with any other treating provider, pharmacy or emergency room where I receive care.     I have read this agreement and have asked questions about anything I did not understand.    ________________________________________________________  Patient Signature - Richard Loza     ___________________                   Date     ________________________________________________________  Provider Signature - CONRAD Younger CNP       ___________________                   Date      ________________________________________________________  Witness Signature (required if provider not present while patient signing)          ___________________                   Date

## 2023-10-23 NOTE — RESULT ENCOUNTER NOTE
Dear Richard,    Here is a summary of your recent test results:    Labs look AMAZING!  You have significant improvement of your triglycerides from 737 to 159.  Keep up the good work.    -Drug screen showed expected results.  All other labs normal.       For additional lab test information, labtestsonline.org is an excellent reference.    In addition, here is a list of due or overdue Health Maintenance reminders:    There are no preventive care reminders to display for this patient.    Please call us at [phone] (or use Lua) to address the above recommendations if needed.    Thank you for choosing  [facility].  It was an honor and a privilege to participate in your care.       Healthy regards,    Cata Buenrostro, PRICILLA  [facility]

## 2024-01-12 DIAGNOSIS — F90.2 ATTENTION DEFICIT HYPERACTIVITY DISORDER (ADHD), COMBINED TYPE: ICD-10-CM

## 2024-01-13 RX ORDER — DEXTROAMPHETAMINE SACCHARATE, AMPHETAMINE ASPARTATE, DEXTROAMPHETAMINE SULFATE AND AMPHETAMINE SULFATE 1.25; 1.25; 1.25; 1.25 MG/1; MG/1; MG/1; MG/1
TABLET ORAL
Qty: 120 TABLET | Refills: 0 | Status: SHIPPED | OUTPATIENT
Start: 2024-01-13 | End: 2024-03-18

## 2024-04-30 ENCOUNTER — VIRTUAL VISIT (OUTPATIENT)
Dept: FAMILY MEDICINE | Facility: CLINIC | Age: 54
End: 2024-04-30
Payer: COMMERCIAL

## 2024-04-30 DIAGNOSIS — E78.2 ELEVATED TRIGLYCERIDES WITH HIGH CHOLESTEROL: ICD-10-CM

## 2024-04-30 DIAGNOSIS — F41.1 GAD (GENERALIZED ANXIETY DISORDER): ICD-10-CM

## 2024-04-30 DIAGNOSIS — F90.2 ATTENTION DEFICIT HYPERACTIVITY DISORDER (ADHD), COMBINED TYPE: ICD-10-CM

## 2024-04-30 DIAGNOSIS — Z79.899 CONTROLLED SUBSTANCE AGREEMENT SIGNED: ICD-10-CM

## 2024-04-30 DIAGNOSIS — D84.9 IMMUNOSUPPRESSED STATUS (H): ICD-10-CM

## 2024-04-30 DIAGNOSIS — K50.80 CROHN'S DISEASE OF BOTH SMALL AND LARGE INTESTINE WITHOUT COMPLICATION (H): Primary | ICD-10-CM

## 2024-04-30 PROCEDURE — 99214 OFFICE O/P EST MOD 30 MIN: CPT | Mod: 95 | Performed by: PHYSICIAN ASSISTANT

## 2024-04-30 RX ORDER — DEXTROAMPHETAMINE SACCHARATE, AMPHETAMINE ASPARTATE, DEXTROAMPHETAMINE SULFATE AND AMPHETAMINE SULFATE 1.25; 1.25; 1.25; 1.25 MG/1; MG/1; MG/1; MG/1
TABLET ORAL
Qty: 120 TABLET | Refills: 0 | Status: SHIPPED | OUTPATIENT
Start: 2024-05-30

## 2024-04-30 RX ORDER — OMEGA-3-ACID ETHYL ESTERS 1 G/1
2 CAPSULE, LIQUID FILLED ORAL 2 TIMES DAILY
Status: SHIPPED
Start: 2024-04-30

## 2024-04-30 RX ORDER — DEXTROAMPHETAMINE SACCHARATE, AMPHETAMINE ASPARTATE, DEXTROAMPHETAMINE SULFATE AND AMPHETAMINE SULFATE 1.25; 1.25; 1.25; 1.25 MG/1; MG/1; MG/1; MG/1
TABLET ORAL
Qty: 120 TABLET | Refills: 0 | Status: SHIPPED | OUTPATIENT
Start: 2024-06-29

## 2024-04-30 RX ORDER — DEXTROAMPHETAMINE SACCHARATE, AMPHETAMINE ASPARTATE, DEXTROAMPHETAMINE SULFATE AND AMPHETAMINE SULFATE 1.25; 1.25; 1.25; 1.25 MG/1; MG/1; MG/1; MG/1
TABLET ORAL
Qty: 120 TABLET | Refills: 0 | Status: SHIPPED | OUTPATIENT
Start: 2024-04-30

## 2024-04-30 RX ORDER — LANOLIN ALCOHOL/MO/W.PET/CERES
500 CREAM (GRAM) TOPICAL AT BEDTIME
Status: SHIPPED
Start: 2024-04-30

## 2024-04-30 ASSESSMENT — PATIENT HEALTH QUESTIONNAIRE - PHQ9
SUM OF ALL RESPONSES TO PHQ QUESTIONS 1-9: 0
10. IF YOU CHECKED OFF ANY PROBLEMS, HOW DIFFICULT HAVE THESE PROBLEMS MADE IT FOR YOU TO DO YOUR WORK, TAKE CARE OF THINGS AT HOME, OR GET ALONG WITH OTHER PEOPLE: NOT DIFFICULT AT ALL
SUM OF ALL RESPONSES TO PHQ QUESTIONS 1-9: 0

## 2024-04-30 ASSESSMENT — ANXIETY QUESTIONNAIRES
2. NOT BEING ABLE TO STOP OR CONTROL WORRYING: NOT AT ALL
GAD7 TOTAL SCORE: 0
7. FEELING AFRAID AS IF SOMETHING AWFUL MIGHT HAPPEN: NOT AT ALL
8. IF YOU CHECKED OFF ANY PROBLEMS, HOW DIFFICULT HAVE THESE MADE IT FOR YOU TO DO YOUR WORK, TAKE CARE OF THINGS AT HOME, OR GET ALONG WITH OTHER PEOPLE?: NOT DIFFICULT AT ALL
IF YOU CHECKED OFF ANY PROBLEMS ON THIS QUESTIONNAIRE, HOW DIFFICULT HAVE THESE PROBLEMS MADE IT FOR YOU TO DO YOUR WORK, TAKE CARE OF THINGS AT HOME, OR GET ALONG WITH OTHER PEOPLE: NOT DIFFICULT AT ALL
7. FEELING AFRAID AS IF SOMETHING AWFUL MIGHT HAPPEN: NOT AT ALL
GAD7 TOTAL SCORE: 0
4. TROUBLE RELAXING: NOT AT ALL
1. FEELING NERVOUS, ANXIOUS, OR ON EDGE: NOT AT ALL
6. BECOMING EASILY ANNOYED OR IRRITABLE: NOT AT ALL
3. WORRYING TOO MUCH ABOUT DIFFERENT THINGS: NOT AT ALL
GAD7 TOTAL SCORE: 0
5. BEING SO RESTLESS THAT IT IS HARD TO SIT STILL: NOT AT ALL

## 2024-04-30 NOTE — PROGRESS NOTES
"Richard is a 54 year old who is being evaluated via a billable video visit.    How would you like to obtain your AVS? MyChart  If the video visit is dropped, the invitation should be resent by: Text to cell phone: [phone]  Will anyone else be joining your video visit? No      Assessment & Plan     Crohns disease (H) - crohn's - ileocolonic; followed by [facility] every 6 months  Immunosuppressed status (H)- secondary to Humira injections every other week   Disease stable.  No flares.  Continues to follow with [facility] as recommended  - adalimumab (HUMIRA *CF*) 40 MG/0.4ML pen kit    Attention deficit hyperactivity disorder (ADHD), combined type  Controlled substance agreement signed - 10/18/2023 -every 6 month office visits -Adderall 5 mg #120 monthly  Well-controlled.  Continue current regimen.  - amphetamine-dextroamphetamine (ADDERALL) 5 MG tablet  Dispense: 120 tablet; Refill: 0  - amphetamine-dextroamphetamine (ADDERALL) 5 MG tablet  Dispense: 120 tablet; Refill: 0  - amphetamine-dextroamphetamine (ADDERALL) 5 MG tablet  Dispense: 120 tablet; Refill: 0    JANAK (generalized anxiety disorder)  Stable.  Continue current regimen.  - sertraline (ZOLOFT) 50 MG tablet  Dispense: 135 tablet; Refill: 1    Elevated triglycerides with high cholesterol  Continue current supplementation and recommend diet and exercise efforts.  - omega-3 acid ethyl esters (LOVAZA) 1 g capsule  - niacin (SLO-NIACIN) 500 MG CR tablet      Review of prior external note(s) from - CareEverywhere information from [facility] reviewed        BMI  Estimated body mass index is 27.29 kg/m  as calculated from the following:    Height as of 10/18/23: 1.73 m (5' 8.1\").    Weight as of 10/18/23: 81.6 kg (180 lb).   Weight management plan: Patient was referred to their PCP to discuss a diet and exercise plan.      Return in about 6 months (around 10/30/2024) for Physical Exam, Fasting labs, Medication recheck.      Christine Payne MBA, MS, PA-[facility] " [facility] [place]      Sheryl Ramos is a 54 year old, presenting for the following health issues:  Recheck Medication        4/30/2024     3:14 PM   Additional Questions   Roomed by Laurie LUDWIG   Accompanied by Self     History of Present Illness       Reason for visit:  Med check    He eats 2-3 servings of fruits and vegetables daily.He consumes 0 sweetened beverage(s) daily.He exercises with enough effort to increase his heart rate 30 to 60 minutes per day.  He exercises with enough effort to increase his heart rate 5 days per week.   He is taking medications regularly.     Hyperlipidemia  Has not made any significant diet or exercise changes.  Taking niacin and Lovaza consistently.  Recent Labs   Lab Test 10/18/23  0957 04/22/21  0855   CHOL 154 207*   HDL 43 29*   LDL 79 Cannot estimate LDL when triglyceride exceeds 400 mg/dL  80   TRIG 159* 737*     Crohn's  Follows with [facility].  Last visit 8/22/2023.  Continues on Humira.  Repeat colonoscopy due 2026.    ADHD  Mostly uses during workweek.  Takes Adderall 10 mg (two 5 mg tablets) in the am, 5 mg at noon and 5 mg again at 3/4 pm.    Taking Medication as prescribed: yes  Side Effects:  None  Medication Helping Symptoms:  yes     Anxiety/insomnia  Doing quite well.  Continues with sertraline 75 mg daily.  Uses trazodone in spurts but does not feel the need to use these frequently.  Uses alprazolam sparingly.  How are you doing with your anxiety since your last visit? Improved   Are you having other symptoms that might be associated with anxiety? No  Have you had a significant life event? No   Are you feeling depressed? No  Do you have any concerns with your use of alcohol or other drugs? No    Social History     Tobacco Use    Smoking status: Never     Passive exposure: Never    Smokeless tobacco: Never   Vaping Use    Vaping status: Never Used   Substance Use Topics    Alcohol use: Yes     Alcohol/week: 0.0 - 0.8 standard drinks of alcohol     Comment:  1 drink/week    Drug use: No         8/11/2022     8:08 AM 9/8/2022     5:00 PM 4/30/2024     3:11 PM   JANAK-7 SCORE   Total Score 20 (severe anxiety) 6 (mild anxiety) 0 (minimal anxiety)   Total Score 20 6 0         8/11/2022     8:07 AM 9/8/2022     5:00 PM 4/30/2024     3:10 PM   PHQ   PHQ-9 Total Score 19 8 0   Q9: Thoughts of better off dead/self-harm past 2 weeks Several days Not at all Not at all   F/U: Thoughts of suicide or self-harm Yes     F/U: Self harm-plan Yes     F/U: Self-harm action Yes     F/U: Safety concerns No           4/30/2024     3:10 PM   Last PHQ-9   1.  Little interest or pleasure in doing things 0   2.  Feeling down, depressed, or hopeless 0   3.  Trouble falling or staying asleep, or sleeping too much 0   4.  Feeling tired or having little energy 0   5.  Poor appetite or overeating 0   6.  Feeling bad about yourself 0   7.  Trouble concentrating 0   8.  Moving slowly or restless 0   Q9: Thoughts of better off dead/self-harm past 2 weeks 0   PHQ-9 Total Score 0         4/30/2024     3:11 PM   JANAK-7    1. Feeling nervous, anxious, or on edge 0   2. Not being able to stop or control worrying 0   3. Worrying too much about different things 0   4. Trouble relaxing 0   5. Being so restless that it is hard to sit still 0   6. Becoming easily annoyed or irritable 0   7. Feeling afraid, as if something awful might happen 0   JANAK-7 Total Score 0   If you checked any problems, how difficult have they made it for you to do your work, take care of things at home, or get along with other people? Not difficult at all           Review of Systems  Constitutional, HEENT, cardiovascular, pulmonary, GI, , musculoskeletal, neuro, skin, endocrine and psych systems are negative, except as otherwise noted.      Objective           Vitals:  No vitals were obtained today due to virtual visit.    Physical Exam   GENERAL: alert and no distress  EYES: Eyes grossly normal to inspection.  No discharge or erythema,  or obvious scleral/conjunctival abnormalities.  RESP: No audible wheeze, cough, or visible cyanosis.    SKIN: Visible skin clear. No significant rash, abnormal pigmentation or lesions.  NEURO: Cranial nerves grossly intact.  Mentation and speech appropriate for age.  PSYCH: Appropriate affect, tone, and pace of words    No results found for any visits on 04/30/24.      Video-Visit Details    Type of service:  Video Visit   Originating Location (pt. Location): Home    Distant Location (provider location):  On-site  Platform used for Video Visit: Hero  Signed Electronically by: Christine Payne PA-C

## 2024-09-20 ENCOUNTER — TRANSFERRED RECORDS (OUTPATIENT)
Dept: HEALTH INFORMATION MANAGEMENT | Facility: CLINIC | Age: 54
End: 2024-09-20
Payer: COMMERCIAL

## 2024-09-20 LAB
ALT SERPL-CCNC: 31 IU/L (ref 0–44)
AST SERPL-CCNC: 38 IU/L (ref 0–40)

## 2024-10-28 DIAGNOSIS — F41.1 GAD (GENERALIZED ANXIETY DISORDER): ICD-10-CM

## 2024-10-28 NOTE — TELEPHONE ENCOUNTER
90 days sent to pharmacy.  Please advise patient due for annual fasting physical w/med check for further fills.      Christine Payne MBA, MS, PA-C  [facility]

## 2024-11-11 ENCOUNTER — TELEPHONE (OUTPATIENT)
Dept: FAMILY MEDICINE | Facility: CLINIC | Age: 54
End: 2024-11-11
Payer: COMMERCIAL

## 2024-11-11 DIAGNOSIS — Z79.899 CONTROLLED SUBSTANCE AGREEMENT SIGNED: ICD-10-CM

## 2024-11-11 DIAGNOSIS — F90.2 ATTENTION DEFICIT HYPERACTIVITY DISORDER (ADHD), COMBINED TYPE: ICD-10-CM

## 2024-11-11 RX ORDER — DEXTROAMPHETAMINE SACCHARATE, AMPHETAMINE ASPARTATE, DEXTROAMPHETAMINE SULFATE AND AMPHETAMINE SULFATE 1.25; 1.25; 1.25; 1.25 MG/1; MG/1; MG/1; MG/1
TABLET ORAL
Qty: 120 TABLET | Refills: 0 | Status: SHIPPED | OUTPATIENT
Start: 2024-11-11

## 2024-11-11 NOTE — TELEPHONE ENCOUNTER
Patient calls for refill    Routing to provider to review and advise.     MAURIZIO DIAL RN on 11/11/2024 at 3:01 PM   [facility]

## 2024-11-11 NOTE — TELEPHONE ENCOUNTER
Have not seen in a year.   Routing to provider who provided last fill and who he set up physical with.    Healthy regards,            Cata Buenrostro, FNP-BC

## 2024-12-13 ENCOUNTER — TRANSFERRED RECORDS (OUTPATIENT)
Dept: HEALTH INFORMATION MANAGEMENT | Facility: CLINIC | Age: 54
End: 2024-12-13
Payer: COMMERCIAL